# Patient Record
Sex: FEMALE | Race: BLACK OR AFRICAN AMERICAN | NOT HISPANIC OR LATINO | Employment: OTHER | URBAN - METROPOLITAN AREA
[De-identification: names, ages, dates, MRNs, and addresses within clinical notes are randomized per-mention and may not be internally consistent; named-entity substitution may affect disease eponyms.]

---

## 2017-07-14 ENCOUNTER — HOSPITAL ENCOUNTER (EMERGENCY)
Facility: HOSPITAL | Age: 77
Discharge: HOME | End: 2017-07-14
Attending: EMERGENCY MEDICINE
Payer: MEDICARE

## 2017-07-14 VITALS
RESPIRATION RATE: 18 BRPM | TEMPERATURE: 98 F | WEIGHT: 130 LBS | HEIGHT: 62 IN | OXYGEN SATURATION: 100 % | DIASTOLIC BLOOD PRESSURE: 69 MMHG | BODY MASS INDEX: 23.92 KG/M2 | SYSTOLIC BLOOD PRESSURE: 132 MMHG | HEART RATE: 56 BPM

## 2017-07-14 DIAGNOSIS — I10 UNCONTROLLED HYPERTENSION: ICD-10-CM

## 2017-07-14 DIAGNOSIS — N63.0 BREAST NODULE: Primary | ICD-10-CM

## 2017-07-14 DIAGNOSIS — R07.89 CHEST WALL PAIN: ICD-10-CM

## 2017-07-14 LAB
ALBUMIN SERPL BCP-MCNC: 3.9 G/DL
ALP SERPL-CCNC: 79 U/L
ALT SERPL W/O P-5'-P-CCNC: 15 U/L
ANION GAP SERPL CALC-SCNC: 8 MMOL/L
AST SERPL-CCNC: 18 U/L
BACTERIA #/AREA URNS HPF: NORMAL /HPF
BASOPHILS # BLD AUTO: 0.02 K/UL
BASOPHILS NFR BLD: 0.2 %
BILIRUB SERPL-MCNC: 0.3 MG/DL
BILIRUB UR QL STRIP: NEGATIVE
BNP SERPL-MCNC: 245 PG/ML
BUN SERPL-MCNC: 26 MG/DL
CALCIUM SERPL-MCNC: 10.1 MG/DL
CHLORIDE SERPL-SCNC: 105 MMOL/L
CLARITY UR: CLEAR
CO2 SERPL-SCNC: 26 MMOL/L
COLOR UR: ABNORMAL
CREAT SERPL-MCNC: 1.3 MG/DL
DIFFERENTIAL METHOD: ABNORMAL
EOSINOPHIL # BLD AUTO: 0.1 K/UL
EOSINOPHIL NFR BLD: 1.1 %
ERYTHROCYTE [DISTWIDTH] IN BLOOD BY AUTOMATED COUNT: 13.4 %
EST. GFR  (AFRICAN AMERICAN): 46 ML/MIN/1.73 M^2
EST. GFR  (NON AFRICAN AMERICAN): 40 ML/MIN/1.73 M^2
GLUCOSE SERPL-MCNC: 206 MG/DL
GLUCOSE UR QL STRIP: ABNORMAL
HCT VFR BLD AUTO: 36.3 %
HGB BLD-MCNC: 12.2 G/DL
HGB UR QL STRIP: NEGATIVE
HYALINE CASTS #/AREA URNS LPF: 1 /LPF
KETONES UR QL STRIP: NEGATIVE
LEUKOCYTE ESTERASE UR QL STRIP: NEGATIVE
LYMPHOCYTES # BLD AUTO: 3.3 K/UL
LYMPHOCYTES NFR BLD: 37.1 %
MCH RBC QN AUTO: 31.9 PG
MCHC RBC AUTO-ENTMCNC: 33.6 %
MCV RBC AUTO: 95 FL
MICROSCOPIC COMMENT: NORMAL
MONOCYTES # BLD AUTO: 0.7 K/UL
MONOCYTES NFR BLD: 7.9 %
NEUTROPHILS # BLD AUTO: 4.7 K/UL
NEUTROPHILS NFR BLD: 53.7 %
NITRITE UR QL STRIP: NEGATIVE
PH UR STRIP: 5 [PH] (ref 5–8)
PLATELET # BLD AUTO: 348 K/UL
PMV BLD AUTO: 9.1 FL
POTASSIUM SERPL-SCNC: 3.9 MMOL/L
PROT SERPL-MCNC: 8.2 G/DL
PROT UR QL STRIP: ABNORMAL
RBC # BLD AUTO: 3.83 M/UL
RBC #/AREA URNS HPF: 2 /HPF (ref 0–4)
SODIUM SERPL-SCNC: 139 MMOL/L
SP GR UR STRIP: 1.01 (ref 1–1.03)
SQUAMOUS #/AREA URNS HPF: 3 /HPF
TROPONIN I SERPL DL<=0.01 NG/ML-MCNC: 0.01 NG/ML
URN SPEC COLLECT METH UR: ABNORMAL
UROBILINOGEN UR STRIP-ACNC: NEGATIVE EU/DL
WBC # BLD AUTO: 8.77 K/UL
WBC #/AREA URNS HPF: 2 /HPF (ref 0–5)

## 2017-07-14 PROCEDURE — 99284 EMERGENCY DEPT VISIT MOD MDM: CPT | Mod: 25

## 2017-07-14 PROCEDURE — 83880 ASSAY OF NATRIURETIC PEPTIDE: CPT

## 2017-07-14 PROCEDURE — 80053 COMPREHEN METABOLIC PANEL: CPT

## 2017-07-14 PROCEDURE — 93005 ELECTROCARDIOGRAM TRACING: CPT

## 2017-07-14 PROCEDURE — 81000 URINALYSIS NONAUTO W/SCOPE: CPT

## 2017-07-14 PROCEDURE — 84484 ASSAY OF TROPONIN QUANT: CPT

## 2017-07-14 PROCEDURE — 96374 THER/PROPH/DIAG INJ IV PUSH: CPT

## 2017-07-14 PROCEDURE — 96361 HYDRATE IV INFUSION ADD-ON: CPT

## 2017-07-14 PROCEDURE — 63600175 PHARM REV CODE 636 W HCPCS: Performed by: EMERGENCY MEDICINE

## 2017-07-14 PROCEDURE — 85025 COMPLETE CBC W/AUTO DIFF WBC: CPT

## 2017-07-14 PROCEDURE — 25000003 PHARM REV CODE 250: Performed by: EMERGENCY MEDICINE

## 2017-07-14 RX ORDER — BENZONATATE 200 MG/1
200 CAPSULE ORAL 3 TIMES DAILY PRN
Qty: 20 CAPSULE | Refills: 0 | Status: SHIPPED | OUTPATIENT
Start: 2017-07-14 | End: 2017-07-24

## 2017-07-14 RX ORDER — ASPIRIN 325 MG
325 TABLET ORAL
Status: COMPLETED | OUTPATIENT
Start: 2017-07-14 | End: 2017-07-14

## 2017-07-14 RX ORDER — METOPROLOL TARTRATE 25 MG/1
25 TABLET, FILM COATED ORAL
Status: COMPLETED | OUTPATIENT
Start: 2017-07-14 | End: 2017-07-14

## 2017-07-14 RX ORDER — LOSARTAN POTASSIUM AND HYDROCHLOROTHIAZIDE 12.5; 5 MG/1; MG/1
2 TABLET ORAL
Status: COMPLETED | OUTPATIENT
Start: 2017-07-14 | End: 2017-07-14

## 2017-07-14 RX ORDER — PANTOPRAZOLE SODIUM 40 MG/1
40 TABLET, DELAYED RELEASE ORAL DAILY
COMMUNITY

## 2017-07-14 RX ORDER — DOXYCYCLINE 100 MG/1
100 CAPSULE ORAL 2 TIMES DAILY
Qty: 20 CAPSULE | Refills: 0 | Status: SHIPPED | OUTPATIENT
Start: 2017-07-14 | End: 2017-07-14

## 2017-07-14 RX ORDER — DOXYCYCLINE 100 MG/1
100 CAPSULE ORAL 2 TIMES DAILY
Qty: 20 CAPSULE | Refills: 0 | Status: SHIPPED | OUTPATIENT
Start: 2017-07-14 | End: 2017-07-24

## 2017-07-14 RX ORDER — CLONIDINE HYDROCHLORIDE 0.1 MG/1
0.2 TABLET ORAL
Status: COMPLETED | OUTPATIENT
Start: 2017-07-14 | End: 2017-07-14

## 2017-07-14 RX ORDER — TRAMADOL HYDROCHLORIDE 50 MG/1
50 TABLET ORAL EVERY 8 HOURS PRN
Qty: 15 TABLET | Refills: 0 | Status: SHIPPED | OUTPATIENT
Start: 2017-07-14 | End: 2017-07-17

## 2017-07-14 RX ORDER — RISEDRONATE SODIUM 35 MG/1
35 TABLET, FILM COATED ORAL
COMMUNITY

## 2017-07-14 RX ORDER — HYDRALAZINE HYDROCHLORIDE 20 MG/ML
10 INJECTION INTRAMUSCULAR; INTRAVENOUS
Status: COMPLETED | OUTPATIENT
Start: 2017-07-14 | End: 2017-07-14

## 2017-07-14 RX ORDER — TRAMADOL HYDROCHLORIDE 50 MG/1
50 TABLET ORAL EVERY 6 HOURS PRN
COMMUNITY
End: 2017-07-14 | Stop reason: SDUPTHER

## 2017-07-14 RX ADMIN — HYDRALAZINE HYDROCHLORIDE 10 MG: 20 INJECTION INTRAMUSCULAR; INTRAVENOUS at 04:07

## 2017-07-14 RX ADMIN — SODIUM CHLORIDE 500 ML: 0.9 INJECTION, SOLUTION INTRAVENOUS at 05:07

## 2017-07-14 RX ADMIN — CLONIDINE HYDROCHLORIDE 0.2 MG: 0.1 TABLET ORAL at 03:07

## 2017-07-14 RX ADMIN — LOSARTAN POTASSIUM AND HYDROCHLOROTHIAZIDE 2 TABLET: 12.5; 5 TABLET ORAL at 03:07

## 2017-07-14 RX ADMIN — ASPIRIN 325 MG ORAL TABLET 325 MG: 325 PILL ORAL at 03:07

## 2017-07-14 RX ADMIN — METOPROLOL TARTRATE 25 MG: 25 TABLET ORAL at 03:07

## 2017-07-14 NOTE — ED PROVIDER NOTES
"Encounter Date: 7/14/2017    SCRIBE #1 NOTE: I, Zelda Mcknight, am scribing for, and in the presence of,  Alcon Jacinto MD. I have scribed the following portions of the note - Other sections scribed: HPI and ROS.       History     Chief Complaint   Patient presents with    Breast Pain     " I had a left mastectomy in 86 but about one month ago I noticed a knot in my right breast. I am having alot of pain in my right breast and now some in the left breast as well."      CC: Breast Pain    HPI: This 77 y.o. Female with PMHx of HTN, DM, breast cancer, and arthritis present to the ED c/o 1 month hx of acute onset, constant bilateral breast pain. Pt states she noticed a knot in her right breast when the pain started, but then the pain began in her left breast as well. Pt describes right breast pain as severe and left breast pain as mild. Pt also c/o SOB. No attempted treatment. Pt notes she has chronic constant headaches, and she attributes them to HTN. Pt denies fever, abdominal pain, nausea, and vomiting.       The history is provided by the patient. No  was used.     Review of patient's allergies indicates:  No Known Allergies  Past Medical History:   Diagnosis Date    Arthritis     Cancer     breast    Diabetes mellitus     Glaucoma     Hypertension      Past Surgical History:   Procedure Laterality Date    HYSTERECTOMY      MASTECTOMY       History reviewed. No pertinent family history.  Social History   Substance Use Topics    Smoking status: Current Every Day Smoker    Smokeless tobacco: Current User    Alcohol use Yes      Comment: occasional     Review of Systems   Constitutional: Negative for chills, diaphoresis and fever.   HENT: Negative for ear pain and sore throat.    Eyes: Negative for pain.   Respiratory: Positive for shortness of breath. Negative for cough.    Cardiovascular: Negative for chest pain.   Gastrointestinal: Negative for abdominal pain, diarrhea, nausea and " vomiting.   Genitourinary: Negative for dysuria.        (+) Bilateral breast pain.    Musculoskeletal: Negative for back pain.   Skin: Negative for rash.   Neurological: Positive for headaches (Chronic, constant).   All other systems reviewed and are negative.      Physical Exam     Initial Vitals [07/14/17 1435]   BP Pulse Resp Temp SpO2   (!) 221/107 65 16 98.9 °F (37.2 °C) 99 %      MAP       145         Physical Exam  Nursing note and vitals reviewed.  Constitutional:  Nontoxic elderly female in no obvious distress  HENT:    Head: NC/AT    Eyes: Conjunctivae normal.   (-) scleral icterus.             Mouth/Throat: MMM.      Neck: Neck supple, normal rom.    Cardiovascular: RRR  Pulmonary/Chest: CTAB / Left-sided mastectomy.    3 x 2 cm palpable mass medial aspect right breast.  No clavicular or axillary lymphadenopathy.  Abdominal: Soft. ND/NT   (-) CVA tenderness.  Musculoskeletal: FROM of all major joints. No extremity edema or tenderness.  Neurological: A&Ox3, Normal speech.  No acute focal motor deficits.     Skin: Skin is warm and dry.   Psychiatric: normal mood and affect.      ED Course   Procedures  Labs Reviewed   CBC W/ AUTO DIFFERENTIAL - Abnormal; Notable for the following:        Result Value    RBC 3.83 (*)     Hematocrit 36.3 (*)     MCH 31.9 (*)     MPV 9.1 (*)     All other components within normal limits   COMPREHENSIVE METABOLIC PANEL - Abnormal; Notable for the following:     Glucose 206 (*)     BUN, Bld 26 (*)     eGFR if  46 (*)     eGFR if non  40 (*)     All other components within normal limits   B-TYPE NATRIURETIC PEPTIDE - Abnormal; Notable for the following:      (*)     All other components within normal limits   URINALYSIS - Abnormal; Notable for the following:     Protein, UA 1+ (*)     Glucose, UA 1+ (*)     All other components within normal limits   TROPONIN I   URINALYSIS MICROSCOPIC        X-Rays:   Independently Interpreted Readings:    Chest X-Ray: Normal heart size.  No acute abnormalities.  No infiltrates.     EKG Readings: (Independently Interpreted)   Initial Reading: No STEMI.   Sinus bradycardia, rate 59, + LVH with repolarization abnormality, T-wave inversion in leads 1, aVL, V5 and V6      Medical Decision Making:   History:   I obtained history from: someone other than patient.  Old Medical Records: I decided to obtain old medical records.  Old Records Summarized: records from clinic visits and other records.  Independently Interpreted Test(s):   I have ordered and independently interpreted X-rays - see prior notes.  I have ordered and independently interpreted EKG Reading(s) - see prior notes  Clinical Tests:   Lab Tests: Ordered and Reviewed  Radiological Study: Ordered and Reviewed  Medical Tests: Ordered and Reviewed    Differential diagnosis:   Initial differential diagnosis includes but is not limited to... Fibrocystic changes, breast cancer, ACS, PE, hypertensive emergency including aortic dissection, pericarditis.     Additional Medical Decision Makin-year-old female with a negative for hypertension, diabetes, arthritis and a remote history of breast cancer status post mastectomy to the emergency department complaining of a painful right breast nodule for the past 4-6 weeks - see history of present illness for further details.  On exam, she appears well and in no obvious distress.  She has a 3 cm x 2 cm palpable nodule to the medial aspect of the right breast.   No palpable axillary or clavicular lymphadenopathy.  Initial /107.  EKG demonstrates significant LVH with T-wave inversion in the lateral leads largely unchanged from previous EKG - troponin negative.  Basic labs unremarkable without evidence of end organ damage.  Home antihypertensives meds have been given along with IV hydralazine.  She has been strongly advised to follow-up with her primary care physician as soon as possible for further evaluation and  management including mammogram vs ultrasound/FNA.  Chest pain precautions as well as return instructions discussed prior to discharge.          Scribe Attestation:   Scribe #1: I performed the above scribed service and the documentation accurately describes the services I performed. I attest to the accuracy of the note.    Attending Attestation:           Physician Attestation for Scribe:  Physician Attestation Statement for Scribe #1: I, Alcon Jacinto MD, reviewed documentation, as scribed by Zelda Mcknight in my presence, and it is both accurate and complete.                 ED Course     Clinical Impression:   The primary encounter diagnosis was Right sided breast nodule. Diagnoses of Chest wall pain and Uncontrolled hypertension were also pertinent to this visit.                           Alcon Jacinto MD  07/14/17 2015

## 2017-07-14 NOTE — ED NOTES
MD made aware of decreased BP and pt complaints of dizziness. States will order bolus of fluids. Side rails up. Bed lowered. Call light in reach. Daughter at bedside. Will continue to monitor

## 2017-07-14 NOTE — ED TRIAGE NOTES
"Arrived via personal transportation. Pt complains of rt sided breast pain that started about 1 month ago. Pt states "I had a mastectomy in either 1985 or 1986. The pain is on my rt side but it's moving to my left side where I had the mastectomy". AAOx3.   "

## 2021-07-09 ENCOUNTER — HOSPITAL ENCOUNTER (EMERGENCY)
Facility: HOSPITAL | Age: 81
Discharge: HOME | End: 2021-07-09
Attending: EMERGENCY MEDICINE
Payer: MEDICARE

## 2021-07-09 VITALS
BODY MASS INDEX: 24.29 KG/M2 | SYSTOLIC BLOOD PRESSURE: 174 MMHG | RESPIRATION RATE: 18 BRPM | OXYGEN SATURATION: 100 % | DIASTOLIC BLOOD PRESSURE: 83 MMHG | WEIGHT: 132 LBS | HEIGHT: 62 IN | TEMPERATURE: 98 F | HEART RATE: 58 BPM

## 2021-07-09 DIAGNOSIS — S86.911A KNEE STRAIN, RIGHT, INITIAL ENCOUNTER: ICD-10-CM
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
## 2021-07-09 DIAGNOSIS — M79.652 LEFT THIGH PAIN: ICD-10-CM

## 2021-07-09 DIAGNOSIS — S70.12XA HEMATOMA OF LEFT THIGH, INITIAL ENCOUNTER: Primary | ICD-10-CM

## 2021-07-09 PROCEDURE — 99284 EMERGENCY DEPT VISIT MOD MDM: CPT

## 2021-12-29 ENCOUNTER — HOSPITAL ENCOUNTER (EMERGENCY)
Facility: HOSPITAL | Age: 81
Discharge: HOME | End: 2021-12-29
Attending: EMERGENCY MEDICINE
Payer: MEDICARE

## 2021-12-29 VITALS
WEIGHT: 134 LBS | HEART RATE: 62 BPM | TEMPERATURE: 102 F | RESPIRATION RATE: 18 BRPM | OXYGEN SATURATION: 98 % | DIASTOLIC BLOOD PRESSURE: 64 MMHG | SYSTOLIC BLOOD PRESSURE: 141 MMHG | BODY MASS INDEX: 24.66 KG/M2 | HEIGHT: 62 IN

## 2021-12-29 DIAGNOSIS — R51.9 ACUTE NONINTRACTABLE HEADACHE, UNSPECIFIED HEADACHE TYPE: ICD-10-CM

## 2021-12-29 DIAGNOSIS — R52 BODY ACHES: ICD-10-CM

## 2021-12-29 DIAGNOSIS — U07.1 COVID-19 VIRUS INFECTION: Primary | ICD-10-CM

## 2021-12-29 DIAGNOSIS — R05.9 COUGH: ICD-10-CM

## 2021-12-29 LAB
BACTERIA #/AREA URNS HPF: NORMAL /HPF
BILIRUB UR QL STRIP: NEGATIVE
CLARITY UR: CLEAR
COLOR UR: COLORLESS
CTP QC/QA: YES
CTP QC/QA: YES
GLUCOSE UR QL STRIP: NEGATIVE
HGB UR QL STRIP: NEGATIVE
HYALINE CASTS #/AREA URNS LPF: 0 /LPF
KETONES UR QL STRIP: NEGATIVE
LEUKOCYTE ESTERASE UR QL STRIP: NEGATIVE
MICROSCOPIC COMMENT: NORMAL
NITRITE UR QL STRIP: NEGATIVE
PH UR STRIP: 5 [PH] (ref 5–8)
POC MOLECULAR INFLUENZA A AGN: NEGATIVE
POC MOLECULAR INFLUENZA B AGN: NEGATIVE
PROT UR QL STRIP: ABNORMAL
RBC #/AREA URNS HPF: 1 /HPF (ref 0–4)
SARS-COV-2 RDRP RESP QL NAA+PROBE: POSITIVE
SP GR UR STRIP: 1.01 (ref 1–1.03)
URN SPEC COLLECT METH UR: ABNORMAL
UROBILINOGEN UR STRIP-ACNC: NEGATIVE EU/DL
WBC #/AREA URNS HPF: 0 /HPF (ref 0–5)
WBC CLUMPS URNS QL MICRO: NORMAL

## 2021-12-29 PROCEDURE — 25000242 PHARM REV CODE 250 ALT 637 W/ HCPCS: Performed by: EMERGENCY MEDICINE

## 2021-12-29 PROCEDURE — 81000 URINALYSIS NONAUTO W/SCOPE: CPT | Performed by: EMERGENCY MEDICINE

## 2021-12-29 PROCEDURE — 25000003 PHARM REV CODE 250: Performed by: EMERGENCY MEDICINE

## 2021-12-29 PROCEDURE — U0002 COVID-19 LAB TEST NON-CDC: HCPCS | Performed by: EMERGENCY MEDICINE

## 2021-12-29 PROCEDURE — 87502 INFLUENZA DNA AMP PROBE: CPT

## 2021-12-29 PROCEDURE — 94640 AIRWAY INHALATION TREATMENT: CPT

## 2021-12-29 PROCEDURE — 99284 EMERGENCY DEPT VISIT MOD MDM: CPT | Mod: 25

## 2021-12-29 RX ORDER — GUAIFENESIN/DEXTROMETHORPHAN 100-10MG/5
5 SYRUP ORAL 4 TIMES DAILY PRN
Qty: 120 ML | Refills: 0 | Status: SHIPPED | OUTPATIENT
Start: 2021-12-29 | End: 2022-01-08

## 2021-12-29 RX ORDER — ACETAMINOPHEN 500 MG
1000 TABLET ORAL
Status: COMPLETED | OUTPATIENT
Start: 2021-12-29 | End: 2021-12-29

## 2021-12-29 RX ORDER — IBUPROFEN 600 MG/1
600 TABLET ORAL
Status: COMPLETED | OUTPATIENT
Start: 2021-12-29 | End: 2021-12-29

## 2021-12-29 RX ORDER — BENZONATATE 200 MG/1
200 CAPSULE ORAL 3 TIMES DAILY PRN
Qty: 20 CAPSULE | Refills: 0 | Status: SHIPPED | OUTPATIENT
Start: 2021-12-29 | End: 2022-01-08

## 2021-12-29 RX ORDER — ALBUTEROL SULFATE 90 UG/1
4 AEROSOL, METERED RESPIRATORY (INHALATION)
Status: COMPLETED | OUTPATIENT
Start: 2021-12-29 | End: 2021-12-29

## 2021-12-29 RX ADMIN — ACETAMINOPHEN 1000 MG: 500 TABLET ORAL at 03:12

## 2021-12-29 RX ADMIN — IBUPROFEN 600 MG: 600 TABLET, FILM COATED ORAL at 02:12

## 2021-12-29 RX ADMIN — ALBUTEROL SULFATE 4 PUFF: 108 INHALANT RESPIRATORY (INHALATION) at 05:12

## 2021-12-29 NOTE — ED PROVIDER NOTES
Encounter Date: 12/29/2021       History     Chief Complaint   Patient presents with    Headache     Headache, bilateral eye pain, dizziness, weakness since last night.     Weakness     80 yo female with CKD3, DM2, aortic valve stenosis, CAD, presents via daughter with headache, generalized weakness, eye pain bilaterally, right-sided ear pain, body aches especially to legs, and cough, ongoing for a few days.  Patient denies shortness of breath or chest pain.    PMH: CKD3, DM2, aortic valve stenosis, atrial fibrillation, palpitations, diastolic dysfunction, MI, CAD, LVH, dyspnea on exertion, heart murmur, diabetic polyneuropathy, rheumatoid arthritis, HTN, DLD, breast cancer, osteoporosis, GERD, colon polyp, vitamin D deficiency, vertigo, thyroid nodules, bilateral glaucoma, bilateral macular degeneration, carpal tunnel    PSH: colonoscopy, R breast biopsy, hysterectomy, left mastectomy    PMD is Dr. Saravanan Ashley.  [facility]    02/09/21 Echocardiogram showed mild left ventricular hypertrophy, EF 45-50%, basal mid lateral hypokinesis, inferolateral wall is akinetic, mitral sclerosis, mitral annular calcification, trace mitral and aortic insufficiency.     02/09/21 Stress test: small size nonreversible decreased uptake in the apical septal, mid anteroseptal segments. Post-stress large size nonreversible decreased uptake in the inferoseptal and inferior segments during post stress images. Normal LV systolic function with an EF of 76%.     10/29/18 Holter monitor showed minimal heart rate 42, max heart rate 88, sinus rhythm, rare PVCs and PACs. One atrial couplet, 1 episode of SVT, 3 beats.         Review of patient's allergies indicates:  No Known Allergies  Past Medical History:   Diagnosis Date    Arthritis     Cancer     breast    Diabetes mellitus     Glaucoma     Hypertension      Past Surgical History:   Procedure Laterality Date    HYSTERECTOMY       MASTECTOMY       No family history on file.  Social History     Tobacco Use    Smoking status: Current Every Day Smoker    Smokeless tobacco: Current User   Substance Use Topics    Alcohol use: Yes     Comment: occasional    Drug use: No     Review of Systems   Constitutional: Positive for fatigue.   HENT: Positive for ear pain. Negative for sore throat.    Eyes: Negative for visual disturbance.   Respiratory: Positive for cough. Negative for shortness of breath.    Cardiovascular: Negative for chest pain and leg swelling.   Gastrointestinal: Negative for abdominal pain.   Genitourinary: Negative for dysuria.   Musculoskeletal: Positive for gait problem (limps due to arthritis).   Skin: Negative for rash.   Neurological: Positive for headaches. Negative for dizziness, syncope and light-headedness.       Physical Exam     Initial Vitals [12/29/21 0052]   BP Pulse Resp Temp SpO2   (!) 141/64 84 16 (!) 102.5 °F (39.2 °C) 99 %      MAP       --         Physical Exam    Nursing note and vitals reviewed.  Constitutional: She appears well-developed and well-nourished. She is not diaphoretic.   Awake, alert, nontoxic.   HENT:   Head: Normocephalic and atraumatic.   Mouth/Throat: Oropharynx is clear and moist.   Eyes: Conjunctivae and EOM are normal. Pupils are equal, round, and reactive to light.   Neck: Neck supple.   Normal range of motion.  Cardiovascular: Normal rate, regular rhythm and intact distal pulses.   Murmur heard.  Pulmonary/Chest: Breath sounds normal. No respiratory distress. She has no wheezes. She has no rhonchi. She has no rales.   S/p left mastectomy   Abdominal: Abdomen is soft. There is no abdominal tenderness.   Musculoskeletal:         General: No tenderness or edema. Normal range of motion.      Cervical back: Normal range of motion and neck supple.     Neurological: She is alert and oriented to person, place, and time. She has normal strength.   Moving all extremities.   Skin: Skin is warm and  dry. No erythema. No pallor.   Psychiatric: She has a normal mood and affect.         ED Course   Procedures  Labs Reviewed   URINALYSIS, REFLEX TO URINE CULTURE - Abnormal; Notable for the following components:       Result Value    Color, UA Colorless (*)     Protein, UA 1+ (*)     All other components within normal limits    Narrative:     Specimen Source->Urine   SARS-COV-2 RDRP GENE - Abnormal; Notable for the following components:    POC Rapid COVID Positive (*)     All other components within normal limits   POCT INFLUENZA A/B MOLECULAR - Normal   URINALYSIS MICROSCOPIC    Narrative:     Specimen Source->Urine          Imaging Results    None          Medications   ibuprofen tablet 600 mg (600 mg Oral Given 12/29/21 0200)   acetaminophen tablet 1,000 mg (1,000 mg Oral Given 12/29/21 0310)   albuterol inhaler 4 puff (4 puffs Inhalation Given 12/29/21 0530)     Medical Decision Making:   History:   Old Medical Records: I decided to obtain old medical records.  Old Records Summarized: records from another hospital.  Initial Assessment:   80 yo female with CKD3, DM2, aortic valve stenosis, CAD, presents with headache, generalized weakness, eye pain bilaterally, right-sided ear pain, body aches especially to legs, and cough, ongoing for a few days.   Differential Diagnosis:   Ddx includes COVID-19, influenza, other viral URI, other.  Clinical Tests:   Lab Tests: Reviewed and Ordered  ED Management:  COVID-19 positive.    Ambulatory pulse ox 94% with HR 87 in ED on room air.  Patient in no distress with ambulation.    Tx'ed with APAP and ibuprofen for fever and albuterol for cough.    D/c'ed with supportive care. Referral for antibody infusion placed due to COVID-19 risk score 4.                       Clinical Impression:   Final diagnoses:  [U07.1] COVID-19 virus infection (Primary)  [R05.9] Cough  [R52] Body aches  [R51.9] Acute nonintractable headache, unspecified headache type          ED Disposition Condition     Discharge Stable        ED Prescriptions     Medication Sig Dispense Start Date End Date Auth. Provider    benzonatate (TESSALON) 200 MG capsule Take 1 capsule (200 mg total) by mouth 3 (three) times daily as needed for Cough. Keep this medication away from children. 20 capsule 12/29/2021 1/8/2022 Tammy Langston MD    dextromethorphan-guaiFENesin  mg/5 ml (ROBITUSSIN-DM)  mg/5 mL liquid Take 5 mLs by mouth 4 (four) times daily as needed (cough). 120 mL 12/29/2021 1/8/2022 Tammy Langston MD        Follow-up Information     Follow up With Specialties Details Why Contact Info    Saravanan Ashley MD Internal Medicine  As needed [address]  [facility] [address]  [phone]             Tammy Langston MD  12/29/21 0884

## 2021-12-29 NOTE — ED NOTES
Bed: 16main  Expected date:   Expected time:   Means of arrival: Personal Transportation  Comments:

## 2021-12-29 NOTE — DISCHARGE INSTRUCTIONS
ACUTE COVID-19    COVID-19 is a disease with variable courses. Some people will feel better, while some people will feel worse despite initial improvement.     It is useful to have a PULSE OXIMETER, which is a device to measure the oxygen in your blood. Most pulse oximeters will also measure heart rate. Normal pulse ox is over 95%; normal heart rate is  beats per minute. With COVID-19, many people will have lower oxygen levels and higher heart rates. If your oxygen level drops and stays below 90% (check it at rest and with activity), please come back to the emergency department to be reevaluated.    If you have worsening shortness of breath (even with normal pulse oximeter numbers), trouble breathing, persistent high fevers, or other serious symptoms, please return to the ED for re-evaluation.    SYMPTOM CONTROL    You may be prescribed an albuterol inhaler (Ventolin or ProAir are brand names). Use your albuterol inhaler generously for cough, wheezing, or shortness of breath. You may use 6 puffs every 2 hours if needed. Be aware that this medication can make your heart race, but these palpitations will resolve after an hour or so.    Use acetaminophen and ibuprofen to control your symptoms such as body aches, headaches, or fever. Be careful, because many over the counter medications such as Nyquil or Theraflu contain acetaminophen, ibuprofen, or both.     Over the counter tylenol (acetaminophen) comes in tablets of 325mg and 500mg. You may take 3 of the regular strength (325mg) or 2 of the extra strength (500mg) every 6 hours. Do not exceed a total of 4000mg a day.    Over the counter motrin (ibuprofen) comes in tablets of 200mg. You may take 3 of these tablets (a total of 600mg) every 6 hours or 4 of these tablets (a total of 800mg) every 8 hours. Do not exceed a total of 2400mg a day.     Benadryl (diphenhydramine) is used to treat nasal congestion or allergies. The usual adult dose is 50 mg (usually 2  over-the-counter tablets) every 4-6 hours as needed.  Benadryl can cause sedation, so it is good to take before bed. Do not take this medication before driving.    Flonase is a nasal steroid that can also be used for congestion.    COVID-19 AFTER EFFECTS    Persistent symptoms of COVID-19 have emerged. The most commonly reported symptoms include fatigue, dyspnea, cough, arthralgia, and chest pain. Other reported symptoms include cognitive impairment, depression, myalgia, headache, fever, and palpitations.  More serious complications appear to be less common but have been reported. These complications include:    Cardiovascular: myocardial inflammation, ventricular dysfunction  Respiratory: pulmonary function abnormalities  Renal: acute kidney injury  Dermatologic: rash, alopecia  Neurological: olfactory and gustatory dysfunction, sleep dysregulation, altered cognition, memory impairment  Psychiatric: depression, anxiety, changes in mood     Most people who have COVID-19 do well, but it is important to follow up to your physician if you don't feel better.

## 2022-01-02 ENCOUNTER — INFUSION (OUTPATIENT)
Dept: INFECTIOUS DISEASES | Facility: HOSPITAL | Age: 82
End: 2022-01-02
Attending: EMERGENCY MEDICINE
Payer: MEDICARE

## 2022-01-02 DIAGNOSIS — U07.1 COVID-19 VIRUS INFECTION: Primary | ICD-10-CM

## 2022-01-02 PROCEDURE — M0243 CASIRIVI AND IMDEVI INFUSION: HCPCS | Performed by: INTERNAL MEDICINE

## 2022-01-02 PROCEDURE — 25000003 PHARM REV CODE 250: Performed by: INTERNAL MEDICINE

## 2022-01-02 PROCEDURE — 63600175 PHARM REV CODE 636 W HCPCS: Performed by: INTERNAL MEDICINE

## 2022-01-03 VITALS
BODY MASS INDEX: 24.29 KG/M2 | OXYGEN SATURATION: 100 % | DIASTOLIC BLOOD PRESSURE: 60 MMHG | SYSTOLIC BLOOD PRESSURE: 126 MMHG | HEIGHT: 62 IN | TEMPERATURE: 98 F | HEART RATE: 48 BPM | RESPIRATION RATE: 19 BRPM | WEIGHT: 132 LBS

## 2022-01-03 RX ORDER — ALBUTEROL SULFATE 90 UG/1
2 AEROSOL, METERED RESPIRATORY (INHALATION)
Status: ACTIVE | OUTPATIENT
Start: 2022-01-03

## 2022-01-03 RX ORDER — DIPHENHYDRAMINE HYDROCHLORIDE 50 MG/ML
25 INJECTION INTRAMUSCULAR; INTRAVENOUS ONCE AS NEEDED
Status: ACTIVE | OUTPATIENT
Start: 2022-01-03 | End: 2033-06-01

## 2022-01-03 RX ORDER — ACETAMINOPHEN 325 MG/1
650 TABLET ORAL ONCE AS NEEDED
Status: ACTIVE | OUTPATIENT
Start: 2022-01-03 | End: 2033-06-01

## 2022-01-03 RX ORDER — SODIUM CHLORIDE 0.9 % (FLUSH) 0.9 %
10 SYRINGE (ML) INJECTION
Status: ACTIVE | OUTPATIENT
Start: 2022-01-03

## 2022-01-03 RX ORDER — ONDANSETRON 4 MG/1
4 TABLET, ORALLY DISINTEGRATING ORAL ONCE AS NEEDED
Status: ACTIVE | OUTPATIENT
Start: 2022-01-03 | End: 2033-06-01

## 2022-01-03 RX ORDER — EPINEPHRINE 0.3 MG/.3ML
0.3 INJECTION SUBCUTANEOUS
Status: ACTIVE | OUTPATIENT
Start: 2022-01-03

## 2022-01-03 RX ADMIN — CASIRIVIMAB AND IMDEVIMAB 600 MG: 600; 600 INJECTION, SOLUTION, CONCENTRATE INTRAVENOUS at 03:01

## 2022-01-03 NOTE — PROGRESS NOTES
Patient arrives for casirivimab/ imdevimab infusion. Ambulatory. Pt AAox3. No distress noted. RR even and unlabored.     Symptoms: HA, dizziness, body aches, SOB    onset date:  12/29/21    Tested COVID + on 12/29/21

## 2022-01-03 NOTE — PROGRESS NOTES
Infusion complete. Pt tolerated infusion well. No S/S of infusion reaction noted at present time. One hour observation started.

## 2022-01-03 NOTE — PROGRESS NOTES
Patient remains with no signs of complications noted. Patient received casirivimab/ imdevimab infusion with filtered tubing according to FDA recommendations and [facility] SOP without complications noted and left with mask in place. Drug fact sheet provided. Pt discharged home. Ambulatory. Remains AAox3. No distress noted. RR even and unlabored.

## 2022-01-19 ENCOUNTER — PES CALL (OUTPATIENT)
Dept: ADMINISTRATIVE | Facility: CLINIC | Age: 82
End: 2022-01-19
Payer: MEDICARE

## 2023-04-10 ENCOUNTER — HOSPITAL ENCOUNTER (EMERGENCY)
Facility: HOSPITAL | Age: 83
Discharge: HOME | End: 2023-04-10
Attending: STUDENT IN AN ORGANIZED HEALTH CARE EDUCATION/TRAINING PROGRAM
Payer: MEDICARE

## 2023-04-10 VITALS
BODY MASS INDEX: 24.66 KG/M2 | WEIGHT: 134 LBS | HEIGHT: 62 IN | HEART RATE: 60 BPM | TEMPERATURE: 99 F | RESPIRATION RATE: 18 BRPM | DIASTOLIC BLOOD PRESSURE: 68 MMHG | OXYGEN SATURATION: 98 % | SYSTOLIC BLOOD PRESSURE: 152 MMHG

## 2023-04-10 DIAGNOSIS — N39.490 OVERFLOW INCONTINENCE: ICD-10-CM

## 2023-04-10 DIAGNOSIS — R33.9 URINARY RETENTION: Primary | ICD-10-CM

## 2023-04-10 DIAGNOSIS — R10.30 LOWER ABDOMINAL PAIN: ICD-10-CM

## 2023-04-10 LAB
ALBUMIN SERPL BCP-MCNC: 3.9 G/DL (ref 3.5–5.2)
ALP SERPL-CCNC: 64 U/L (ref 55–135)
ALT SERPL W/O P-5'-P-CCNC: 19 U/L (ref 10–44)
ANION GAP SERPL CALC-SCNC: 13 MMOL/L (ref 8–16)
AST SERPL-CCNC: 21 U/L (ref 10–40)
BASOPHILS # BLD AUTO: 0.03 K/UL (ref 0–0.2)
BASOPHILS NFR BLD: 0.4 % (ref 0–1.9)
BILIRUB SERPL-MCNC: 0.8 MG/DL (ref 0.1–1)
BILIRUB UR QL STRIP: NEGATIVE
BUN SERPL-MCNC: 29 MG/DL (ref 8–23)
CALCIUM SERPL-MCNC: 9.8 MG/DL (ref 8.7–10.5)
CHLORIDE SERPL-SCNC: 107 MMOL/L (ref 95–110)
CLARITY UR: CLEAR
CO2 SERPL-SCNC: 23 MMOL/L (ref 23–29)
COLOR UR: YELLOW
CREAT SERPL-MCNC: 1.5 MG/DL (ref 0.5–1.4)
DIFFERENTIAL METHOD: ABNORMAL
EOSINOPHIL # BLD AUTO: 0.1 K/UL (ref 0–0.5)
EOSINOPHIL NFR BLD: 1.6 % (ref 0–8)
ERYTHROCYTE [DISTWIDTH] IN BLOOD BY AUTOMATED COUNT: 14.1 % (ref 11.5–14.5)
EST. GFR  (NO RACE VARIABLE): 34 ML/MIN/1.73 M^2
GLUCOSE SERPL-MCNC: 218 MG/DL (ref 70–110)
GLUCOSE UR QL STRIP: NEGATIVE
HCT VFR BLD AUTO: 31.7 % (ref 37–48.5)
HGB BLD-MCNC: 10.7 G/DL (ref 12–16)
HGB UR QL STRIP: ABNORMAL
IMM GRANULOCYTES # BLD AUTO: 0.03 K/UL (ref 0–0.04)
IMM GRANULOCYTES NFR BLD AUTO: 0.4 % (ref 0–0.5)
KETONES UR QL STRIP: NEGATIVE
LEUKOCYTE ESTERASE UR QL STRIP: NEGATIVE
LYMPHOCYTES # BLD AUTO: 1.9 K/UL (ref 1–4.8)
LYMPHOCYTES NFR BLD: 25.9 % (ref 18–48)
MCH RBC QN AUTO: 31.8 PG (ref 27–31)
MCHC RBC AUTO-ENTMCNC: 33.8 G/DL (ref 32–36)
MCV RBC AUTO: 94 FL (ref 82–98)
MONOCYTES # BLD AUTO: 0.5 K/UL (ref 0.3–1)
MONOCYTES NFR BLD: 6.9 % (ref 4–15)
NEUTROPHILS # BLD AUTO: 4.9 K/UL (ref 1.8–7.7)
NEUTROPHILS NFR BLD: 64.8 % (ref 38–73)
NITRITE UR QL STRIP: NEGATIVE
NRBC BLD-RTO: 0 /100 WBC
PH UR STRIP: 5 [PH] (ref 5–8)
PLATELET # BLD AUTO: ABNORMAL K/UL (ref 150–450)
PLATELET BLD QL SMEAR: ABNORMAL
PMV BLD AUTO: ABNORMAL FL (ref 9.2–12.9)
POTASSIUM SERPL-SCNC: 3.7 MMOL/L (ref 3.5–5.1)
PROT SERPL-MCNC: 7.7 G/DL (ref 6–8.4)
PROT UR QL STRIP: ABNORMAL
RBC # BLD AUTO: 3.36 M/UL (ref 4–5.4)
SODIUM SERPL-SCNC: 143 MMOL/L (ref 136–145)
SP GR UR STRIP: 1.01 (ref 1–1.03)
URN SPEC COLLECT METH UR: ABNORMAL
UROBILINOGEN UR STRIP-ACNC: NEGATIVE EU/DL
WBC # BLD AUTO: 7.5 K/UL (ref 3.9–12.7)

## 2023-04-10 PROCEDURE — 80053 COMPREHEN METABOLIC PANEL: CPT | Performed by: STUDENT IN AN ORGANIZED HEALTH CARE EDUCATION/TRAINING PROGRAM

## 2023-04-10 PROCEDURE — P9612 CATHETERIZE FOR URINE SPEC: HCPCS

## 2023-04-10 PROCEDURE — 96360 HYDRATION IV INFUSION INIT: CPT

## 2023-04-10 PROCEDURE — 25000003 PHARM REV CODE 250: Performed by: STUDENT IN AN ORGANIZED HEALTH CARE EDUCATION/TRAINING PROGRAM

## 2023-04-10 PROCEDURE — 99284 EMERGENCY DEPT VISIT MOD MDM: CPT | Mod: 25

## 2023-04-10 PROCEDURE — 81003 URINALYSIS AUTO W/O SCOPE: CPT | Performed by: STUDENT IN AN ORGANIZED HEALTH CARE EDUCATION/TRAINING PROGRAM

## 2023-04-10 PROCEDURE — 51798 US URINE CAPACITY MEASURE: CPT

## 2023-04-10 PROCEDURE — 51701 INSERT BLADDER CATHETER: CPT

## 2023-04-10 PROCEDURE — 85025 COMPLETE CBC W/AUTO DIFF WBC: CPT | Performed by: STUDENT IN AN ORGANIZED HEALTH CARE EDUCATION/TRAINING PROGRAM

## 2023-04-10 RX ORDER — CEPHALEXIN 500 MG/1
500 CAPSULE ORAL
Status: COMPLETED | OUTPATIENT
Start: 2023-04-10 | End: 2023-04-10

## 2023-04-10 RX ORDER — CEPHALEXIN 500 MG/1
500 CAPSULE ORAL EVERY 8 HOURS
Qty: 12 CAPSULE | Refills: 0 | Status: SHIPPED | OUTPATIENT
Start: 2023-04-10 | End: 2023-04-14

## 2023-04-10 RX ADMIN — SODIUM CHLORIDE 500 ML: 9 INJECTION, SOLUTION INTRAVENOUS at 04:04

## 2023-04-10 RX ADMIN — CEPHALEXIN 500 MG: 500 CAPSULE ORAL at 09:04

## 2023-04-10 NOTE — ED PROVIDER NOTES
Encounter Date: 4/10/2023       History     Chief Complaint   Patient presents with    Dysuria     Pt reports bladder pain and dysuria since Saturday. Denies fever or back pain.     83 year old female presents for lower abdominal pain and fullness associated with decreased urine output for the past 9 hours. She's had decreased urine output, and symptoms of incontinence since Saturday. She denies other symptoms.     Review of patient's allergies indicates:  No Known Allergies  Past Medical History:   Diagnosis Date    Arthritis     Cancer     breast    Diabetes mellitus     Glaucoma     Hypertension      Past Surgical History:   Procedure Laterality Date    HYSTERECTOMY      MASTECTOMY       History reviewed. No pertinent family history.  Social History     Tobacco Use    Smoking status: Every Day    Smokeless tobacco: Current   Substance Use Topics    Alcohol use: Yes     Comment: occasional    Drug use: No     Review of Systems   Constitutional:  Negative for activity change, appetite change, chills, fever and unexpected weight change.   HENT:  Negative for dental problem and drooling.    Eyes:  Negative for discharge and itching.   Respiratory:  Negative for cough, chest tightness, shortness of breath, wheezing and stridor.    Cardiovascular:  Negative for chest pain, palpitations and leg swelling.   Gastrointestinal:  Negative for abdominal distention, abdominal pain, diarrhea and nausea.   Genitourinary:  Positive for decreased urine volume, dysuria and urgency. Negative for difficulty urinating and frequency.   Musculoskeletal:  Negative for back pain, gait problem and joint swelling.   Neurological:  Negative for dizziness, syncope, numbness and headaches.   Psychiatric/Behavioral:  Negative for agitation, behavioral problems and confusion.      Physical Exam     Initial Vitals [04/10/23 1419]   BP Pulse Resp Temp SpO2   (!) 161/76 70 17 98.7 °F (37.1 °C) 97 %      MAP       --         Physical Exam    Nursing  note and vitals reviewed.  Constitutional: She appears well-developed and well-nourished. She is not diaphoretic.   HENT:   Head: Normocephalic and atraumatic.   Mouth/Throat: Oropharynx is clear and moist.   Eyes: EOM are normal. Pupils are equal, round, and reactive to light. Right eye exhibits no discharge. Left eye exhibits no discharge.   Neck: No tracheal deviation present.   Normal range of motion.  Cardiovascular:  Normal rate, regular rhythm and intact distal pulses.           Pulmonary/Chest: No respiratory distress. She has no wheezes. She exhibits no tenderness.   Abdominal: Abdomen is soft. She exhibits distension. There is abdominal tenderness.   Lower abdominal tenderness and distension   Musculoskeletal:         General: No tenderness or edema. Normal range of motion.      Cervical back: Normal range of motion.     Neurological: She is alert and oriented to person, place, and time. She has normal strength. No cranial nerve deficit or sensory deficit. GCS eye subscore is 4. GCS verbal subscore is 5. GCS motor subscore is 6.   Skin: Skin is warm and dry. No rash noted.   Psychiatric: She has a normal mood and affect. Her behavior is normal. Thought content normal.       ED Course   Procedures  Labs Reviewed   CBC W/ AUTO DIFFERENTIAL - Abnormal; Notable for the following components:       Result Value    RBC 3.36 (*)     Hemoglobin 10.7 (*)     Hematocrit 31.7 (*)     MCH 31.8 (*)     Platelet Estimate Clumped (*)     All other components within normal limits   URINALYSIS, REFLEX TO URINE CULTURE - Abnormal; Notable for the following components:    Protein, UA Trace (*)     Occult Blood UA Trace (*)     All other components within normal limits    Narrative:     Specimen Source->Urine   COMPREHENSIVE METABOLIC PANEL - Abnormal; Notable for the following components:    Glucose 218 (*)     BUN 29 (*)     Creatinine 1.5 (*)     eGFR 34 (*)     All other components within normal limits    Narrative:      Lab collect please  Collection has been rescheduled by [facility] at 04/10/2023 19:47 Reason:   Patient unavailable. In restroom          Imaging Results    None          Medications   sodium chloride 0.9% bolus 500 mL 500 mL (0 mLs Intravenous Stopped 4/10/23 1743)   cephALEXin capsule 500 mg (500 mg Oral Given 4/10/23 2110)                            83 year old female presents for dysuria, lower abdominal pain and fullness ongoing since Saturday. Vitals within normal limits. Bladder scan shows 800 mL retained urine, pt unable to void. Cath provided relief with 800 ml urine output. UA w/ hematuria, but will treat for UTI with Keflex given symptoms of dysuria, culture pending. CMP notable for mild elevation in Cr, elevation in BUN, elevated glucose c/w dehdyration, treated with IVF. Patient unable to void again, repeat bladder scan w 750 mL. Summers placed, pt educated, patient stable for discharge with outpatient uro gynecoloy follow-up.    Clinical Impression:   Final diagnoses:  [R33.9] Urinary retention (Primary)  [R10.30] Lower abdominal pain  [N39.490] Overflow incontinence        ED Disposition Condition    Discharge Stable          ED Prescriptions       Medication Sig Dispense Start Date End Date Auth. Provider    cephALEXin (KEFLEX) 500 MG capsule Take 1 capsule (500 mg total) by mouth every 8 (eight) hours. for 4 days 12 capsule 4/10/2023 4/14/2023 Mitchell Smith MD          Follow-up Information       Follow up With Specialties Details Why Contact Info Additional Information    [facility] Song - [facility] Call in 1 day To set up a follow-up appointment, To recheck today's symptoms [address]  [facility] [address] [phone] [address] Building, [address]th Floor Please park in [facility] and take Clinic elevator             Mitchell Smith MD  04/10/23 7870

## 2023-04-10 NOTE — ED TRIAGE NOTES
Pt presents to the ER c/o difficulty urinating. Pt reports that her bladder feels full but she is unable to void. Pt denies any pain.

## 2023-04-11 ENCOUNTER — HOSPITAL ENCOUNTER (EMERGENCY)
Facility: HOSPITAL | Age: 83
Discharge: HOME | End: 2023-04-11
Attending: STUDENT IN AN ORGANIZED HEALTH CARE EDUCATION/TRAINING PROGRAM
Payer: MEDICARE

## 2023-04-11 ENCOUNTER — TELEPHONE (OUTPATIENT)
Dept: EMERGENCY MEDICINE | Facility: HOSPITAL | Age: 83
End: 2023-04-11
Payer: MEDICARE

## 2023-04-11 ENCOUNTER — TELEPHONE (OUTPATIENT)
Dept: UROGYNECOLOGY | Facility: CLINIC | Age: 83
End: 2023-04-11
Payer: MEDICARE

## 2023-04-11 VITALS
OXYGEN SATURATION: 98 % | DIASTOLIC BLOOD PRESSURE: 71 MMHG | TEMPERATURE: 98 F | HEART RATE: 61 BPM | RESPIRATION RATE: 18 BRPM | SYSTOLIC BLOOD PRESSURE: 181 MMHG | WEIGHT: 134 LBS | HEIGHT: 62 IN | BODY MASS INDEX: 24.66 KG/M2

## 2023-04-11 DIAGNOSIS — R31.9 HEMATURIA, UNSPECIFIED TYPE: Primary | ICD-10-CM

## 2023-04-11 DIAGNOSIS — Z97.8 FOLEY CATHETER IN PLACE: ICD-10-CM

## 2023-04-11 DIAGNOSIS — R33.9 URINARY RETENTION: ICD-10-CM

## 2023-04-11 LAB
ALBUMIN SERPL BCP-MCNC: 3.7 G/DL (ref 3.5–5.2)
ALP SERPL-CCNC: 75 U/L (ref 55–135)
ALT SERPL W/O P-5'-P-CCNC: 16 U/L (ref 10–44)
ANION GAP SERPL CALC-SCNC: 11 MMOL/L (ref 8–16)
ANION GAP SERPL CALC-SCNC: 13 MMOL/L (ref 8–16)
AST SERPL-CCNC: 18 U/L (ref 10–40)
BACTERIA #/AREA URNS HPF: ABNORMAL /HPF
BASOPHILS # BLD AUTO: 0.04 K/UL (ref 0–0.2)
BASOPHILS NFR BLD: 0.5 % (ref 0–1.9)
BILIRUB SERPL-MCNC: 0.4 MG/DL (ref 0.1–1)
BILIRUB UR QL STRIP: NEGATIVE
BUN SERPL-MCNC: 28 MG/DL (ref 8–23)
BUN SERPL-MCNC: 29 MG/DL (ref 6–30)
CALCIUM SERPL-MCNC: 9.7 MG/DL (ref 8.7–10.5)
CHLORIDE SERPL-SCNC: 103 MMOL/L (ref 95–110)
CHLORIDE SERPL-SCNC: 107 MMOL/L (ref 95–110)
CLARITY UR: CLEAR
CO2 SERPL-SCNC: 27 MMOL/L (ref 23–29)
COLOR UR: YELLOW
CREAT SERPL-MCNC: 1.7 MG/DL (ref 0.5–1.4)
CREAT SERPL-MCNC: 1.7 MG/DL (ref 0.5–1.4)
DIFFERENTIAL METHOD: ABNORMAL
EOSINOPHIL # BLD AUTO: 0.2 K/UL (ref 0–0.5)
EOSINOPHIL NFR BLD: 2.4 % (ref 0–8)
ERYTHROCYTE [DISTWIDTH] IN BLOOD BY AUTOMATED COUNT: 13.8 % (ref 11.5–14.5)
EST. GFR  (NO RACE VARIABLE): 30 ML/MIN/1.73 M^2
GLUCOSE SERPL-MCNC: 143 MG/DL (ref 70–110)
GLUCOSE SERPL-MCNC: 146 MG/DL (ref 70–110)
GLUCOSE UR QL STRIP: NEGATIVE
HCT VFR BLD AUTO: 31.1 % (ref 37–48.5)
HCT VFR BLD CALC: 31 %PCV (ref 36–54)
HGB BLD-MCNC: 10.1 G/DL (ref 12–16)
HGB UR QL STRIP: ABNORMAL
HYALINE CASTS #/AREA URNS LPF: 0 /LPF
IMM GRANULOCYTES # BLD AUTO: 0.03 K/UL (ref 0–0.04)
IMM GRANULOCYTES NFR BLD AUTO: 0.4 % (ref 0–0.5)
KETONES UR QL STRIP: NEGATIVE
LEUKOCYTE ESTERASE UR QL STRIP: NEGATIVE
LYMPHOCYTES # BLD AUTO: 2.3 K/UL (ref 1–4.8)
LYMPHOCYTES NFR BLD: 27.8 % (ref 18–48)
MCH RBC QN AUTO: 30.9 PG (ref 27–31)
MCHC RBC AUTO-ENTMCNC: 32.5 G/DL (ref 32–36)
MCV RBC AUTO: 95 FL (ref 82–98)
MICROSCOPIC COMMENT: ABNORMAL
MONOCYTES # BLD AUTO: 0.7 K/UL (ref 0.3–1)
MONOCYTES NFR BLD: 8.6 % (ref 4–15)
NEUTROPHILS # BLD AUTO: 5 K/UL (ref 1.8–7.7)
NEUTROPHILS NFR BLD: 60.3 % (ref 38–73)
NITRITE UR QL STRIP: NEGATIVE
NRBC BLD-RTO: 0 /100 WBC
PH UR STRIP: 6 [PH] (ref 5–8)
PLATELET # BLD AUTO: 261 K/UL (ref 150–450)
PMV BLD AUTO: 8.9 FL (ref 9.2–12.9)
POC IONIZED CALCIUM: 1.33 MMOL/L (ref 1.06–1.42)
POC TCO2 (MEASURED): 31 MMOL/L (ref 23–29)
POTASSIUM BLD-SCNC: 3.2 MMOL/L (ref 3.5–5.1)
POTASSIUM SERPL-SCNC: 3.3 MMOL/L (ref 3.5–5.1)
PROT SERPL-MCNC: 7.3 G/DL (ref 6–8.4)
PROT UR QL STRIP: ABNORMAL
RBC # BLD AUTO: 3.27 M/UL (ref 4–5.4)
RBC #/AREA URNS HPF: >100 /HPF (ref 0–4)
SAMPLE: ABNORMAL
SODIUM BLD-SCNC: 143 MMOL/L (ref 136–145)
SODIUM SERPL-SCNC: 145 MMOL/L (ref 136–145)
SP GR UR STRIP: 1.02 (ref 1–1.03)
URN SPEC COLLECT METH UR: ABNORMAL
UROBILINOGEN UR STRIP-ACNC: NEGATIVE EU/DL
WBC # BLD AUTO: 8.28 K/UL (ref 3.9–12.7)
WBC #/AREA URNS HPF: 2 /HPF (ref 0–5)

## 2023-04-11 PROCEDURE — 85025 COMPLETE CBC W/AUTO DIFF WBC: CPT | Performed by: STUDENT IN AN ORGANIZED HEALTH CARE EDUCATION/TRAINING PROGRAM

## 2023-04-11 PROCEDURE — 84295 ASSAY OF SERUM SODIUM: CPT | Mod: 91

## 2023-04-11 PROCEDURE — 25000003 PHARM REV CODE 250: Performed by: STUDENT IN AN ORGANIZED HEALTH CARE EDUCATION/TRAINING PROGRAM

## 2023-04-11 PROCEDURE — 99900035 HC TECH TIME PER 15 MIN (STAT)

## 2023-04-11 PROCEDURE — 84132 ASSAY OF SERUM POTASSIUM: CPT

## 2023-04-11 PROCEDURE — 96360 HYDRATION IV INFUSION INIT: CPT

## 2023-04-11 PROCEDURE — 85014 HEMATOCRIT: CPT | Mod: 91

## 2023-04-11 PROCEDURE — 81000 URINALYSIS NONAUTO W/SCOPE: CPT | Performed by: STUDENT IN AN ORGANIZED HEALTH CARE EDUCATION/TRAINING PROGRAM

## 2023-04-11 PROCEDURE — 82330 ASSAY OF CALCIUM: CPT

## 2023-04-11 PROCEDURE — 82565 ASSAY OF CREATININE: CPT

## 2023-04-11 PROCEDURE — 80053 COMPREHEN METABOLIC PANEL: CPT | Performed by: STUDENT IN AN ORGANIZED HEALTH CARE EDUCATION/TRAINING PROGRAM

## 2023-04-11 PROCEDURE — 99284 EMERGENCY DEPT VISIT MOD MDM: CPT | Mod: 25

## 2023-04-11 RX ORDER — CEPHALEXIN 250 MG/1
500 CAPSULE ORAL
Status: COMPLETED | OUTPATIENT
Start: 2023-04-11 | End: 2023-04-11

## 2023-04-11 RX ORDER — CEPHALEXIN 500 MG/1
500 CAPSULE ORAL 4 TIMES DAILY
Qty: 20 CAPSULE | Refills: 0 | Status: SHIPPED | OUTPATIENT
Start: 2023-04-11 | End: 2023-04-16

## 2023-04-11 RX ADMIN — SODIUM CHLORIDE 500 ML: 9 INJECTION, SOLUTION INTRAVENOUS at 09:04

## 2023-04-11 RX ADMIN — CEPHALEXIN 500 MG: 250 CAPSULE ORAL at 10:04

## 2023-04-11 NOTE — TELEPHONE ENCOUNTER
----- Message from Jazmyn Rutherford sent at 4/11/2023 11:18 AM CDT -----  Type:  Appointment Request    Name of Caller:AMENA AGUILAR [[id number]]  When is the first available appointment?No access  Symptoms:referral  Would the patient rather a call back or a response via [facility]? Call back   Best Call Back Number:[phone]  Additional Information: Patient indicates she has a referral to the department form the ED. Patient indicates she would like to schedule an appointment with any provider for the soonest appointment available. Patient indicates she would like to be seen regarding her active referral. Patient indicates she has Urinary retention & Overflow incontinence. Patient indicates she has a catheter. Patient indicates she would like a call back with further assistance and more information if possible.

## 2023-04-11 NOTE — DISCHARGE INSTRUCTIONS

## 2023-04-11 NOTE — TELEPHONE ENCOUNTER
----- Message from Jazmyn Rutherford sent at 4/11/2023 11:18 AM CDT -----  Type:  Appointment Request    Name of Caller:AMENA AGUILAR [[id number]]  When is the first available appointment?No access  Symptoms:referral  Would the patient rather a call back or a response via [facility]? Call back   Best Call Back Number:[phone]  Additional Information: Patient indicates she has a referral to the department form the ED. Patient indicates she would like to schedule an appointment with any provider for the soonest appointment available. Patient indicates she would like to be seen regarding her active referral. Patient indicates she has Urinary retention & Overflow incontinence. Patient indicates she has a catheter. Patient indicates she would like a call back with further assistance and more information if possible.

## 2023-04-12 ENCOUNTER — TELEPHONE (OUTPATIENT)
Dept: UROLOGY | Facility: CLINIC | Age: 83
End: 2023-04-12
Payer: MEDICARE

## 2023-04-12 NOTE — TELEPHONE ENCOUNTER
Called pt to let her know we were able to schedule her an appt on 4/14/23 at 1:20 pm with Dr Franklin.

## 2023-04-12 NOTE — ED PROVIDER NOTES
Encounter Date: 4/11/2023       History     Chief Complaint   Patient presents with    Hematuria     Seen last night for bladder obstruction and sent home with urinary catheter.  Report blood in urine today with increased pain to lower abdomen.  + Blood thinners     83-year-old female whom I saw yesterday for acute urinary retention, was discharged with a Summers catheter and reactions to have outpatient follow-up with Urology.  Today she returns for hematuria and ongoing lower abdominal pain.  She reports she was unable to make an appointment because Urogynecology did not call her back.  She attempted to  her antibiotics but the [facility] stated they never received the electronic prescription I sent yesterday.  She denies nausea or vomiting.  She denies fevers or chills.  She reports normal bowel movements.  She reported that she takes blood thinners to the triage nurse but her medication list does not have any blood thinners on them.    Review of patient's allergies indicates:  No Known Allergies  Past Medical History:   Diagnosis Date    Arthritis     Cancer     breast    Diabetes mellitus     Glaucoma     Hypertension      Past Surgical History:   Procedure Laterality Date    HYSTERECTOMY      MASTECTOMY       No family history on file.  Social History     Tobacco Use    Smoking status: Every Day    Smokeless tobacco: Current   Substance Use Topics    Alcohol use: Yes     Comment: occasional    Drug use: No     Review of Systems   Constitutional:  Negative for activity change, appetite change, chills, fever and unexpected weight change.   HENT:  Negative for dental problem and drooling.    Eyes:  Negative for discharge and itching.   Respiratory:  Negative for cough, chest tightness, shortness of breath, wheezing and stridor.    Cardiovascular:  Negative for chest pain, palpitations and leg swelling.   Gastrointestinal:  Negative for abdominal distention, abdominal pain, diarrhea and nausea.    Genitourinary:  Positive for hematuria and urgency. Negative for difficulty urinating, dysuria and frequency.   Musculoskeletal:  Negative for back pain, gait problem and joint swelling.   Neurological:  Negative for dizziness, syncope, numbness and headaches.   Psychiatric/Behavioral:  Negative for agitation, behavioral problems and confusion.      Physical Exam     Initial Vitals [04/11/23 1934]   BP Pulse Resp Temp SpO2   (!) 162/75 71 18 98.2 °F (36.8 °C) 98 %      MAP       --         Physical Exam    Nursing note and vitals reviewed.  Constitutional: She appears well-developed and well-nourished. She is not diaphoretic.   HENT:   Head: Normocephalic and atraumatic.   Mouth/Throat: Oropharynx is clear and moist.   Eyes: EOM are normal. Pupils are equal, round, and reactive to light. Right eye exhibits no discharge. Left eye exhibits no discharge.   Neck: No tracheal deviation present.   Normal range of motion.  Cardiovascular:  Normal rate, regular rhythm and intact distal pulses.           Pulmonary/Chest: No respiratory distress. She has no wheezes. She exhibits no tenderness.   Abdominal: Abdomen is soft. She exhibits no distension. There is abdominal tenderness.   Mild lower abdominal tenderness to palpation   Musculoskeletal:         General: No tenderness or edema. Normal range of motion.      Cervical back: Normal range of motion.     Neurological: She is alert and oriented to person, place, and time. She has normal strength. No cranial nerve deficit or sensory deficit. GCS eye subscore is 4. GCS verbal subscore is 5. GCS motor subscore is 6.   Skin: Skin is warm and dry. No rash noted.   Psychiatric: She has a normal mood and affect. Her behavior is normal. Thought content normal.       ED Course   Procedures  Labs Reviewed   CBC W/ AUTO DIFFERENTIAL - Abnormal; Notable for the following components:       Result Value    RBC 3.27 (*)     Hemoglobin 10.1 (*)     Hematocrit 31.1 (*)     MPV 8.9 (*)      All other components within normal limits   COMPREHENSIVE METABOLIC PANEL - Abnormal; Notable for the following components:    Potassium 3.3 (*)     Glucose 146 (*)     BUN 28 (*)     Creatinine 1.7 (*)     eGFR 30 (*)     All other components within normal limits   URINALYSIS, REFLEX TO URINE CULTURE - Abnormal; Notable for the following components:    Protein, UA 2+ (*)     Occult Blood UA 3+ (*)     All other components within normal limits    Narrative:     Specimen Source->Urine   URINALYSIS MICROSCOPIC - Abnormal; Notable for the following components:    RBC, UA >100 (*)     All other components within normal limits    Narrative:     Specimen Source->Urine   ISTAT PROCEDURE - Abnormal; Notable for the following components:    POC Glucose 143 (*)     POC Creatinine 1.7 (*)     POC Potassium 3.2 (*)     POC TCO2 (MEASURED) 31 (*)     POC Hematocrit 31 (*)     All other components within normal limits   ISTAT CHEM8          Imaging Results              CT Abdomen Pelvis  Without Contrast (Final result)  Result time 04/11/23 22:06:50      Final result by Sarahi Nixon MD (04/11/23 22:06:50)                   Impression:      No acute findings on CT abdomen and pelvis without contrast.    Thickening of the distal most stomach.  As above described.  Question epigastric or upper abdominal pain.    Colonic diverticulosis.      Electronically signed by: Sarahi Nixon  Date:    04/11/2023  Time:    22:06               Narrative:    EXAMINATION:  CT ABDOMEN PELVIS WITHOUT    CLINICAL HISTORY:  Hematuria.  Lower abdominal pain.    TECHNIQUE:  5 mm unenhanced axial images from the lung bases through the greater trochanters were performed.  Coronal and sagittal reformatted images were provided.    COMPARISON:  Lower abdominal pain.    FINDINGS:  Within the limits of a noncontrast examination, the liver, spleen, pancreas, kidneys, and adrenal glands are unremarkable.  The gallbladder contains no calcified  gallstones..  In the intrathoracic obstructive uropathy.    There is thickening of the distal most stomach which may be due to peristalsis rather than a discrete lesion.    There is no ascites or gross abdominal adenopathy is seen moderate vascular calcifications are present.    A Summers catheter seen within the urinary bladder.  There is colonic diverticulosis without definite evidence of acute diverticulitis.  There are no pelvic masses or adenopathy.  There is no free pelvic fluid.  The uterus is surgically absent.    At the lung bases, there is mild right basilar atelectatic change.                                       Medications   sodium chloride 0.9% bolus 500 mL 500 mL (0 mLs Intravenous Stopped 4/11/23 2210)   cephALEXin capsule 500 mg (500 mg Oral Given 4/11/23 2235)                 ED Course as of 04/11/23 2300   Tue Apr 11, 2023 2234 CT Abdomen Pelvis  Without Contrast [BS]   2234 CBC auto differential(!) [BS]   2234 Comprehensive metabolic panel(!) [BS]   2234 ISTAT PROCEDURE(!) [BS]   2234 Urinalysis Microscopic(!) [BS]   2234 Urinalysis, Reflex to Urine Culture Urine, Clean Catch(!) [BS]      ED Course User Index  [BS] Mitchell Smith MD               Patient well-appearing and comfortable.  Abdomen is soft and nontender.  Vitals are normal.  Summers in place with hematuria.  UA consistent with hematuria.  CMP with mildly elevated creatinine to 1.7 up from patient's baseline of 1.3.  Patient given 500 mL IV fluids.  CBC without evidence of leukocytosis.  Doubt sepsis given patient well-appearing, normal vitals, no leukocytosis. No flank pain to suggest pyelonephritis.  CT without contrast notable for no acute abnormality.  Patient had been unable to make Urogynecology appointment.  Will message the Urogynecology team and also the patient to urology referral.  Stable for discharge outpatient follow-up.        Clinical Impression:   Final diagnoses:  [R31.9] Hematuria, unspecified type  (Primary)  [Z97.8] Summers catheter in place  [R33.9] Urinary retention        ED Disposition Condition    Discharge Stable          ED Prescriptions    None       Follow-up Information       Follow up With Specialties Details Why Contact Info Additional Information    Osmin Butler MD Urology Call in 1 day To discuss recent ED visit, To set up a follow-up appointment [address]  [address]  [facility] [address]  [phone]       [facility] Urogynecology Call in 1 day To set up a follow-up appointment, To recheck today's symptoms [address] [facility] [address] [phone] Main Building, 5th Floor Please park in [facility] and take Clinic elevator             Mitchell Smith MD  04/11/23 9733

## 2023-04-12 NOTE — ED NOTES
Pt to room 22 for eval of hematuria to catheter and lower abd pain. Pt was seen here last night for urinary retention and went home with feliciano catheter- pt reports urine was originally clear but then had blood with clots in it. Urine dark and unique color at present. Pt denies and n/v.

## 2023-04-12 NOTE — DISCHARGE INSTRUCTIONS

## 2023-04-13 ENCOUNTER — TELEPHONE (OUTPATIENT)
Dept: UROLOGY | Facility: CLINIC | Age: 83
End: 2023-04-13
Payer: MEDICARE

## 2023-04-14 ENCOUNTER — OFFICE VISIT (OUTPATIENT)
Dept: UROLOGY | Facility: CLINIC | Age: 83
End: 2023-04-14
Payer: MEDICARE

## 2023-04-14 VITALS
HEART RATE: 61 BPM | SYSTOLIC BLOOD PRESSURE: 162 MMHG | WEIGHT: 128.31 LBS | BODY MASS INDEX: 23.61 KG/M2 | DIASTOLIC BLOOD PRESSURE: 72 MMHG | HEIGHT: 62 IN

## 2023-04-14 DIAGNOSIS — R33.9 URINARY RETENTION: Primary | ICD-10-CM

## 2023-04-14 PROCEDURE — 3077F PR MOST RECENT SYSTOLIC BLOOD PRESSURE >= 140 MM HG: ICD-10-PCS | Mod: CPTII,S$GLB,, | Performed by: UROLOGY

## 2023-04-14 PROCEDURE — 1125F AMNT PAIN NOTED PAIN PRSNT: CPT | Mod: CPTII,S$GLB,, | Performed by: UROLOGY

## 2023-04-14 PROCEDURE — 1125F PR PAIN SEVERITY QUANTIFIED, PAIN PRESENT: ICD-10-PCS | Mod: CPTII,S$GLB,, | Performed by: UROLOGY

## 2023-04-14 PROCEDURE — 99999 PR PBB SHADOW E&M-EST. PATIENT-LVL III: CPT | Mod: PBBFAC,,, | Performed by: UROLOGY

## 2023-04-14 PROCEDURE — 99999 PR PBB SHADOW E&M-EST. PATIENT-LVL III: ICD-10-PCS | Mod: PBBFAC,,, | Performed by: UROLOGY

## 2023-04-14 PROCEDURE — 99204 PR OFFICE/OUTPT VISIT, NEW, LEVL IV, 45-59 MIN: ICD-10-PCS | Mod: S$GLB,,, | Performed by: UROLOGY

## 2023-04-14 PROCEDURE — 1101F PT FALLS ASSESS-DOCD LE1/YR: CPT | Mod: CPTII,S$GLB,, | Performed by: UROLOGY

## 2023-04-14 PROCEDURE — 1159F PR MEDICATION LIST DOCUMENTED IN MEDICAL RECORD: ICD-10-PCS | Mod: CPTII,S$GLB,, | Performed by: UROLOGY

## 2023-04-14 PROCEDURE — 3078F DIAST BP <80 MM HG: CPT | Mod: CPTII,S$GLB,, | Performed by: UROLOGY

## 2023-04-14 PROCEDURE — 3077F SYST BP >= 140 MM HG: CPT | Mod: CPTII,S$GLB,, | Performed by: UROLOGY

## 2023-04-14 PROCEDURE — 3288F FALL RISK ASSESSMENT DOCD: CPT | Mod: CPTII,S$GLB,, | Performed by: UROLOGY

## 2023-04-14 PROCEDURE — 3288F PR FALLS RISK ASSESSMENT DOCUMENTED: ICD-10-PCS | Mod: CPTII,S$GLB,, | Performed by: UROLOGY

## 2023-04-14 PROCEDURE — 1159F MED LIST DOCD IN RCRD: CPT | Mod: CPTII,S$GLB,, | Performed by: UROLOGY

## 2023-04-14 PROCEDURE — 1101F PR PT FALLS ASSESS DOC 0-1 FALLS W/OUT INJ PAST YR: ICD-10-PCS | Mod: CPTII,S$GLB,, | Performed by: UROLOGY

## 2023-04-14 PROCEDURE — 3078F PR MOST RECENT DIASTOLIC BLOOD PRESSURE < 80 MM HG: ICD-10-PCS | Mod: CPTII,S$GLB,, | Performed by: UROLOGY

## 2023-04-14 PROCEDURE — 99204 OFFICE O/P NEW MOD 45 MIN: CPT | Mod: S$GLB,,, | Performed by: UROLOGY

## 2023-04-14 NOTE — PROGRESS NOTES
[facility]      New Urinary Retention Note    4/14/2023    Referred by:  No ref. provider found    HPI: Seema Kitchen is a very pleasant 83 y.o. female referred for evaluation of acute urinary retention of 1-2 weeks duration. She currently has a chronic indwelling urethral catheter for 2 weeks. She presented to ED with urinary retention. Prior to this she denies any voiding difficulty, frequency, urgency or other issues. No previous urethral or bladder surgery. She is an insulin-dependent diabetic. She has a history of breast cancer. Urine at time showed TNTC RBC with few whites.     A voiding trial was performed today. She leaked out most of the instilled fluid prior to reaching the bathroom. PVR was 0 mL.     Relevant Medications:  No current medications that would be anticipated to impair voiding    Prior Therapies:  None    She does not report prior urodynamic evaluation. She does not reports prior cystoscopic evaluation. CT without contrast demonstrated no  abnormalities.     A review of 10+ systems was conducted with pertinent positive and negative findings documented in HPI with all other systems reviewed and negative.    Past medical, family, surgical and social history reviewed as documented in chart with pertinent positive medical, family, surgical and social history detailed in HPI.    Exam Findings:    Const: no acute distress, conversant and alert  Eyes: anicteric, extraocular muscles intact  ENMT: normocephalic, Nl oral membranes  Cardio: no cyanosis, nl cap refill  Pulm: no tachypnea; no resp distress  Musc: no laceration, no tenderness  Neuro: alert; oriented x 3  Skin: warm, dry; no petichiae  Psych: no anxiety; normal speech          Assessment/Plan:    Chronic Urinary Retention (new, addt'l workup): It is unclear what may have precipitated her urinary retention. We left the catheter out, though we discussed the risk that she may not be able to void. She would not be  able to perform self-catheterization. We will have her return on Tuesday to recheck a PVR.

## 2023-04-17 ENCOUNTER — TELEPHONE (OUTPATIENT)
Dept: UROLOGY | Facility: CLINIC | Age: 83
End: 2023-04-17
Payer: MEDICARE

## 2023-04-18 ENCOUNTER — OFFICE VISIT (OUTPATIENT)
Dept: UROLOGY | Facility: CLINIC | Age: 83
End: 2023-04-18
Payer: MEDICARE

## 2023-04-18 DIAGNOSIS — R33.9 URINARY RETENTION: Primary | ICD-10-CM

## 2023-04-18 PROCEDURE — 99214 PR OFFICE/OUTPT VISIT, EST, LEVL IV, 30-39 MIN: ICD-10-PCS | Mod: S$GLB,,, | Performed by: UROLOGY

## 2023-04-18 PROCEDURE — 99214 OFFICE O/P EST MOD 30 MIN: CPT | Mod: S$GLB,,, | Performed by: UROLOGY

## 2023-04-18 NOTE — PROGRESS NOTES
[facility]        Urinary Retention Return Note     4/18/2023     Referred by:  No ref. provider found     HPI: Seema Kitchen is a very pleasant 83 y.o. female referred for evaluation of acute urinary retention of 1-2 weeks duration. She presented with an indwelling urethral catheter for 2 weeks. She presented to ED with urinary retention. Prior to this she denies any voiding difficulty, frequency, urgency or other issues. No previous urethral or bladder surgery. She is an insulin-dependent diabetic. She has a history of breast cancer. Urine at time showed TNTC RBC with few whites.      A voiding trial was performed last visit. She leaked out most of the instilled fluid prior to reaching the bathroom. PVR was 0 mL.     She returns today reporting 4 mL     Relevant Medications:  No current medications that would be anticipated to impair voiding     Prior Therapies:  None     She does not report prior urodynamic evaluation. She does not reports prior cystoscopic evaluation. CT without contrast demonstrated no  abnormalities.      A review of 10+ systems was conducted with pertinent positive and negative findings documented in HPI with all other systems reviewed and negative.     Past medical, family, surgical and social history reviewed as documented in chart with pertinent positive medical, family, surgical and social history detailed in HPI.     Exam Findings:     Const: no acute distress, conversant and alert  Eyes: anicteric, extraocular muscles intact  ENMT: normocephalic, Nl oral membranes  Cardio: no cyanosis, nl cap refill  Pulm: no tachypnea; no resp distress  Musc: no laceration, no tenderness  Neuro: alert; oriented x 3  Skin: warm, dry; no petichiae  Psych: no anxiety; normal speech           Assessment/Plan:     Chronic Urinary Retention (established,improved): It is unclear what may have precipitated her urinary retention. We left the catheter out, and she has voided well  since last Friday. Will recheck a PVR in 3-4 weeks.

## 2023-05-16 ENCOUNTER — OFFICE VISIT (OUTPATIENT)
Dept: UROLOGY | Facility: CLINIC | Age: 83
End: 2023-05-16
Payer: MEDICARE

## 2023-05-16 VITALS
DIASTOLIC BLOOD PRESSURE: 73 MMHG | SYSTOLIC BLOOD PRESSURE: 157 MMHG | HEIGHT: 62 IN | WEIGHT: 130.06 LBS | HEART RATE: 52 BPM | BODY MASS INDEX: 23.93 KG/M2

## 2023-05-16 DIAGNOSIS — R33.9 URINARY RETENTION: Primary | ICD-10-CM

## 2023-05-16 PROCEDURE — 1126F AMNT PAIN NOTED NONE PRSNT: CPT | Mod: CPTII,,, | Performed by: UROLOGY

## 2023-05-16 PROCEDURE — 3288F PR FALLS RISK ASSESSMENT DOCUMENTED: ICD-10-PCS | Mod: CPTII,,, | Performed by: UROLOGY

## 2023-05-16 PROCEDURE — 1159F PR MEDICATION LIST DOCUMENTED IN MEDICAL RECORD: ICD-10-PCS | Mod: CPTII,,, | Performed by: UROLOGY

## 2023-05-16 PROCEDURE — 1159F MED LIST DOCD IN RCRD: CPT | Mod: CPTII,,, | Performed by: UROLOGY

## 2023-05-16 PROCEDURE — 99999 PR PBB SHADOW E&M-EST. PATIENT-LVL IV: CPT | Mod: PBBFAC,,, | Performed by: UROLOGY

## 2023-05-16 PROCEDURE — 3078F PR MOST RECENT DIASTOLIC BLOOD PRESSURE < 80 MM HG: ICD-10-PCS | Mod: CPTII,,, | Performed by: UROLOGY

## 2023-05-16 PROCEDURE — 1101F PT FALLS ASSESS-DOCD LE1/YR: CPT | Mod: CPTII,,, | Performed by: UROLOGY

## 2023-05-16 PROCEDURE — 1101F PR PT FALLS ASSESS DOC 0-1 FALLS W/OUT INJ PAST YR: ICD-10-PCS | Mod: CPTII,,, | Performed by: UROLOGY

## 2023-05-16 PROCEDURE — 3077F SYST BP >= 140 MM HG: CPT | Mod: CPTII,,, | Performed by: UROLOGY

## 2023-05-16 PROCEDURE — 99214 PR OFFICE/OUTPT VISIT, EST, LEVL IV, 30-39 MIN: ICD-10-PCS | Mod: ,,, | Performed by: UROLOGY

## 2023-05-16 PROCEDURE — 3288F FALL RISK ASSESSMENT DOCD: CPT | Mod: CPTII,,, | Performed by: UROLOGY

## 2023-05-16 PROCEDURE — 3078F DIAST BP <80 MM HG: CPT | Mod: CPTII,,, | Performed by: UROLOGY

## 2023-05-16 PROCEDURE — 3077F PR MOST RECENT SYSTOLIC BLOOD PRESSURE >= 140 MM HG: ICD-10-PCS | Mod: CPTII,,, | Performed by: UROLOGY

## 2023-05-16 PROCEDURE — 99214 OFFICE O/P EST MOD 30 MIN: CPT | Mod: ,,, | Performed by: UROLOGY

## 2023-05-16 PROCEDURE — 99999 PR PBB SHADOW E&M-EST. PATIENT-LVL IV: ICD-10-PCS | Mod: PBBFAC,,, | Performed by: UROLOGY

## 2023-05-16 PROCEDURE — 1126F PR PAIN SEVERITY QUANTIFIED, NO PAIN PRESENT: ICD-10-PCS | Mod: CPTII,,, | Performed by: UROLOGY

## 2023-05-16 RX ORDER — POTASSIUM CHLORIDE 600 MG/1
TABLET, FILM COATED, EXTENDED RELEASE ORAL
COMMUNITY
Start: 2023-04-25

## 2023-05-16 RX ORDER — CLONIDINE 0.1 MG/24H
1 PATCH, EXTENDED RELEASE TRANSDERMAL
COMMUNITY
Start: 2023-01-24

## 2023-05-16 RX ORDER — LOSARTAN POTASSIUM 100 MG/1
100 TABLET ORAL
COMMUNITY
Start: 2023-02-03

## 2023-05-16 RX ORDER — BRIMONIDINE TARTRATE 1.5 MG/ML
SOLUTION/ DROPS OPHTHALMIC
COMMUNITY
Start: 2023-05-15

## 2023-05-16 RX ORDER — ISOSORBIDE MONONITRATE 60 MG/1
60 TABLET, EXTENDED RELEASE ORAL 2 TIMES DAILY
COMMUNITY
Start: 2023-02-17

## 2023-05-16 RX ORDER — CLOPIDOGREL BISULFATE 75 MG/1
75 TABLET ORAL
COMMUNITY
Start: 2023-03-14

## 2023-05-16 RX ORDER — MULTIVITAMIN
1 TABLET ORAL DAILY
COMMUNITY

## 2023-05-16 RX ORDER — INSULIN LISPRO 100 [IU]/ML
INJECTION, SOLUTION INTRAVENOUS; SUBCUTANEOUS
COMMUNITY
Start: 2023-05-12

## 2023-05-16 RX ORDER — CHOLECALCIFEROL (VITAMIN D3) 25 MCG
1000 TABLET ORAL
COMMUNITY

## 2023-05-16 RX ORDER — ROSUVASTATIN CALCIUM 40 MG/1
40 TABLET, COATED ORAL
COMMUNITY
Start: 2023-02-22

## 2023-05-16 RX ORDER — ASPIRIN 81 MG/1
81 TABLET ORAL
COMMUNITY

## 2023-05-16 RX ORDER — LATANOPROST 50 UG/ML
SOLUTION/ DROPS OPHTHALMIC
COMMUNITY
Start: 2023-05-15

## 2023-05-16 RX ORDER — INSULIN DETEMIR 100 [IU]/ML
INJECTION, SOLUTION SUBCUTANEOUS
COMMUNITY
Start: 2023-05-07

## 2023-05-16 RX ORDER — HYDROCHLOROTHIAZIDE 12.5 MG/1
TABLET ORAL
COMMUNITY
Start: 2023-05-11

## 2023-05-16 NOTE — PROGRESS NOTES
[facility]        Urinary Retention Return Note     5/16/2023     Referred by:  No ref. provider found     HPI: Seema Kitchen is a very pleasant 83 y.o. female referred for evaluation of acute urinary retention of 1-2 weeks duration. She presented with an indwelling urethral catheter for 2 weeks. She presented to ED with urinary retention. Prior to this she denies any voiding difficulty, frequency, urgency or other issues. No previous urethral or bladder surgery. She is an insulin-dependent diabetic. She has a history of breast cancer. Urine at time showed TNTC RBC with few whites.     She wanted to discuss her longstanding urgency incontinence today.      A voiding trial was performed on a previous visit. She leaked out most of the instilled fluid prior to reaching the bathroom. PVR was 0 mL.      PVR last visit was 4 mL and today is 8 mL      Relevant Medications:  No current medications that would be anticipated to impair voiding     Prior Therapies:  None     She does not report prior urodynamic evaluation. She does not reports prior cystoscopic evaluation. CT without contrast demonstrated no  abnormalities.      A review of 10+ systems was conducted with pertinent positive and negative findings documented in HPI with all other systems reviewed and negative.     Past medical, family, surgical and social history reviewed as documented in chart with pertinent positive medical, family, surgical and social history detailed in HPI.     Exam Findings:     Const: no acute distress, conversant and alert  Eyes: anicteric, extraocular muscles intact  ENMT: normocephalic, Nl oral membranes  Cardio: no cyanosis, nl cap refill  Pulm: no tachypnea; no resp distress  Musc: no laceration, no tenderness  Neuro: alert; oriented x 3  Skin: warm, dry; no petichiae  Psych: no anxiety; normal speech           Assessment/Plan:     Chronic Urinary Retention (established,improved): It is unclear what may  have precipitated her urinary retention. She is emptying well, though she reports hesitancy and straining. She is hoping we can address her longstanding urgency incontinence. I would like to give her a little more time to be sure that she has no additional issues with retention. If PVR is again minimal next time, we can cautiously begin an OAB medication.

## 2023-07-07 ENCOUNTER — OFFICE VISIT (OUTPATIENT)
Dept: UROLOGY | Facility: CLINIC | Age: 83
End: 2023-07-07
Payer: MEDICARE

## 2023-07-07 VITALS
SYSTOLIC BLOOD PRESSURE: 185 MMHG | DIASTOLIC BLOOD PRESSURE: 70 MMHG | HEART RATE: 50 BPM | HEIGHT: 62 IN | BODY MASS INDEX: 23.21 KG/M2 | WEIGHT: 126.13 LBS

## 2023-07-07 DIAGNOSIS — N39.490 OVERFLOW INCONTINENCE: ICD-10-CM

## 2023-07-07 DIAGNOSIS — R33.9 URINARY RETENTION: ICD-10-CM

## 2023-07-07 PROCEDURE — 3077F PR MOST RECENT SYSTOLIC BLOOD PRESSURE >= 140 MM HG: ICD-10-PCS | Mod: CPTII,S$GLB,, | Performed by: UROLOGY

## 2023-07-07 PROCEDURE — 3078F DIAST BP <80 MM HG: CPT | Mod: CPTII,S$GLB,, | Performed by: UROLOGY

## 2023-07-07 PROCEDURE — 99214 OFFICE O/P EST MOD 30 MIN: CPT | Mod: S$GLB,,, | Performed by: UROLOGY

## 2023-07-07 PROCEDURE — 1125F AMNT PAIN NOTED PAIN PRSNT: CPT | Mod: CPTII,S$GLB,, | Performed by: UROLOGY

## 2023-07-07 PROCEDURE — 3288F FALL RISK ASSESSMENT DOCD: CPT | Mod: CPTII,S$GLB,, | Performed by: UROLOGY

## 2023-07-07 PROCEDURE — 99999 PR PBB SHADOW E&M-EST. PATIENT-LVL V: CPT | Mod: PBBFAC,,, | Performed by: UROLOGY

## 2023-07-07 PROCEDURE — 3077F SYST BP >= 140 MM HG: CPT | Mod: CPTII,S$GLB,, | Performed by: UROLOGY

## 2023-07-07 PROCEDURE — 3078F PR MOST RECENT DIASTOLIC BLOOD PRESSURE < 80 MM HG: ICD-10-PCS | Mod: CPTII,S$GLB,, | Performed by: UROLOGY

## 2023-07-07 PROCEDURE — 99214 PR OFFICE/OUTPT VISIT, EST, LEVL IV, 30-39 MIN: ICD-10-PCS | Mod: S$GLB,,, | Performed by: UROLOGY

## 2023-07-07 PROCEDURE — 1159F MED LIST DOCD IN RCRD: CPT | Mod: CPTII,S$GLB,, | Performed by: UROLOGY

## 2023-07-07 PROCEDURE — 1125F PR PAIN SEVERITY QUANTIFIED, PAIN PRESENT: ICD-10-PCS | Mod: CPTII,S$GLB,, | Performed by: UROLOGY

## 2023-07-07 PROCEDURE — 1101F PR PT FALLS ASSESS DOC 0-1 FALLS W/OUT INJ PAST YR: ICD-10-PCS | Mod: CPTII,S$GLB,, | Performed by: UROLOGY

## 2023-07-07 PROCEDURE — 99999 PR PBB SHADOW E&M-EST. PATIENT-LVL V: ICD-10-PCS | Mod: PBBFAC,,, | Performed by: UROLOGY

## 2023-07-07 PROCEDURE — 3288F PR FALLS RISK ASSESSMENT DOCUMENTED: ICD-10-PCS | Mod: CPTII,S$GLB,, | Performed by: UROLOGY

## 2023-07-07 PROCEDURE — 1159F PR MEDICATION LIST DOCUMENTED IN MEDICAL RECORD: ICD-10-PCS | Mod: CPTII,S$GLB,, | Performed by: UROLOGY

## 2023-07-07 PROCEDURE — 1101F PT FALLS ASSESS-DOCD LE1/YR: CPT | Mod: CPTII,S$GLB,, | Performed by: UROLOGY

## 2023-07-08 NOTE — PROGRESS NOTES
[facility]        Urinary Retention Return Note     7/7/2023     Referred by:  No ref. provider found     HPI: Seema Kitchen is a very pleasant 83 y.o. female referred for evaluation of acute urinary retention of 1-2 weeks duration. She presented with an indwelling urethral catheter for 2 weeks. She presented to ED with urinary retention. Prior to this she denied any voiding difficulty, frequency, urgency or other issues. No previous urethral or bladder surgery. She is an insulin-dependent diabetic. She has a history of breast cancer. Urine at time showed TNTC RBC with few whites.      She wanted to discuss her longstanding urgency incontinence as well, though treatment was deferred until I could be sure that her urinary retention was transient. It now appears so.        PVR over 3-4 prior visits has ranged from 4 - 18 mL.      Relevant Medications:  No current medications that would be anticipated to impair voiding     Prior Therapies:  None     She does not report prior urodynamic evaluation. She does not reports prior cystoscopic evaluation. CT without contrast demonstrated no  abnormalities.      A review of 10+ systems was conducted with pertinent positive and negative findings documented in HPI with all other systems reviewed and negative.     Past medical, family, surgical and social history reviewed as documented in chart with pertinent positive medical, family, surgical and social history detailed in HPI.     Exam Findings:     Const: no acute distress, conversant and alert  Eyes: anicteric, extraocular muscles intact  ENMT: normocephalic, Nl oral membranes  Cardio: no cyanosis, nl cap refill  Pulm: no tachypnea; no resp distress  Musc: no laceration, no tenderness  Neuro: alert; oriented x 3  Skin: warm, dry; no petichiae  Psych: no anxiety; normal speech           Assessment/Plan:     Chronic Urinary Retention (established,improved): It is unclear what may have precipitated  her urinary retention. She is emptying well repeatedly, though there is some straining to void. Urinalyses have remained normal since her visit.     Chronic Urinary Retention (new, maria antonia'l evaluation): She is hoping we can address her longstanding urgency incontinence. We will cautiously begin an OAB medication, checking back to insure this does not result in AUR.     Due to the significant risk of adverse events, I do not recommend the use of anticholinergics, especially oxybutynin in this patient. Anticholinergics such as oxybutynin chloride,have been demonstrated repeatedly to be independently associated with risk of both dementia and development of Alzheimer's disease in the elderly (age >65) ([employer] Intern Med. 2015;175(3):401-407. doi:10.1001/jamainternmed.2014.7663). Accordingly, this patient would need to limit pharmacologic treatment for bladder symptoms to either a beta-3 agonist such as Myrbetriq (mirabegron) or Gemtesa (vibegron). If beta-3 agonist therapy is contraindicated and pharmacologic therapy is necessary, the patient should limit anticholinergic bladder medications to those agents known to have more difficulty crossing the blood-brain barrier such as trospium.

## 2023-08-18 ENCOUNTER — OFFICE VISIT (OUTPATIENT)
Dept: UROLOGY | Facility: CLINIC | Age: 83
End: 2023-08-18
Payer: MEDICARE

## 2023-08-18 VITALS
WEIGHT: 123 LBS | HEIGHT: 62 IN | SYSTOLIC BLOOD PRESSURE: 161 MMHG | DIASTOLIC BLOOD PRESSURE: 74 MMHG | HEART RATE: 47 BPM | BODY MASS INDEX: 22.63 KG/M2

## 2023-08-18 DIAGNOSIS — R33.9 URINARY RETENTION: ICD-10-CM

## 2023-08-18 DIAGNOSIS — N32.81 OAB (OVERACTIVE BLADDER): Primary | ICD-10-CM

## 2023-08-18 PROCEDURE — 3288F FALL RISK ASSESSMENT DOCD: CPT | Mod: CPTII,S$GLB,, | Performed by: UROLOGY

## 2023-08-18 PROCEDURE — 1126F PR PAIN SEVERITY QUANTIFIED, NO PAIN PRESENT: ICD-10-PCS | Mod: CPTII,S$GLB,, | Performed by: UROLOGY

## 2023-08-18 PROCEDURE — 99214 PR OFFICE/OUTPT VISIT, EST, LEVL IV, 30-39 MIN: ICD-10-PCS | Mod: S$GLB,,, | Performed by: UROLOGY

## 2023-08-18 PROCEDURE — 3077F PR MOST RECENT SYSTOLIC BLOOD PRESSURE >= 140 MM HG: ICD-10-PCS | Mod: CPTII,S$GLB,, | Performed by: UROLOGY

## 2023-08-18 PROCEDURE — 99999 PR PBB SHADOW E&M-EST. PATIENT-LVL V: CPT | Mod: PBBFAC,,, | Performed by: UROLOGY

## 2023-08-18 PROCEDURE — 3078F DIAST BP <80 MM HG: CPT | Mod: CPTII,S$GLB,, | Performed by: UROLOGY

## 2023-08-18 PROCEDURE — 1101F PR PT FALLS ASSESS DOC 0-1 FALLS W/OUT INJ PAST YR: ICD-10-PCS | Mod: CPTII,S$GLB,, | Performed by: UROLOGY

## 2023-08-18 PROCEDURE — 99999 PR PBB SHADOW E&M-EST. PATIENT-LVL V: ICD-10-PCS | Mod: PBBFAC,,, | Performed by: UROLOGY

## 2023-08-18 PROCEDURE — 1101F PT FALLS ASSESS-DOCD LE1/YR: CPT | Mod: CPTII,S$GLB,, | Performed by: UROLOGY

## 2023-08-18 PROCEDURE — 99214 OFFICE O/P EST MOD 30 MIN: CPT | Mod: S$GLB,,, | Performed by: UROLOGY

## 2023-08-18 PROCEDURE — 1159F MED LIST DOCD IN RCRD: CPT | Mod: CPTII,S$GLB,, | Performed by: UROLOGY

## 2023-08-18 PROCEDURE — 3288F PR FALLS RISK ASSESSMENT DOCUMENTED: ICD-10-PCS | Mod: CPTII,S$GLB,, | Performed by: UROLOGY

## 2023-08-18 PROCEDURE — 3078F PR MOST RECENT DIASTOLIC BLOOD PRESSURE < 80 MM HG: ICD-10-PCS | Mod: CPTII,S$GLB,, | Performed by: UROLOGY

## 2023-08-18 PROCEDURE — 3077F SYST BP >= 140 MM HG: CPT | Mod: CPTII,S$GLB,, | Performed by: UROLOGY

## 2023-08-18 PROCEDURE — 1159F PR MEDICATION LIST DOCUMENTED IN MEDICAL RECORD: ICD-10-PCS | Mod: CPTII,S$GLB,, | Performed by: UROLOGY

## 2023-08-18 PROCEDURE — 1126F AMNT PAIN NOTED NONE PRSNT: CPT | Mod: CPTII,S$GLB,, | Performed by: UROLOGY

## 2023-08-18 NOTE — PROGRESS NOTES
[facility]        Urinary Retention Return Note     8/18/2023     Referred by:  No ref. provider found     HPI: Seema Kitchen is a very pleasant 83 y.o. female referred for evaluation of acute urinary retention of 1-2 weeks duration. She presented with an indwelling urethral catheter for 2 weeks. She presented to ED with urinary retention. Prior to this she denied any voiding difficulty, frequency, urgency or other issues. No previous urethral or bladder surgery. She is an insulin-dependent diabetic. She has a history of breast cancer. Urine at time showed TNTC RBC with few whites.      She wanted to discuss her longstanding urgency incontinence as well, though treatment was deferred until I could be sure that her urinary retention was transient. It now appears so.      Last visit, she was started on Myrbetriq 25 mg for bothersome UUI. However, she stopped taking this after 1 day when she reported diarrhea.      PVR over 3-4 prior visits has ranged from 4 - 18 mL. PVR today is 0 mL.      Relevant Medications:  No current medications that would be anticipated to impair voiding     Prior Therapies:  None     She does not report prior urodynamic evaluation. She does not reports prior cystoscopic evaluation. CT without contrast demonstrated no  abnormalities.      A review of 10+ systems was conducted with pertinent positive and negative findings documented in HPI with all other systems reviewed and negative.     Past medical, family, surgical and social history reviewed as documented in chart with pertinent positive medical, family, surgical and social history detailed in HPI.     Exam Findings:     Const: no acute distress, conversant and alert  Eyes: anicteric, extraocular muscles intact  ENMT: normocephalic, Nl oral membranes  Cardio: no cyanosis, nl cap refill  Pulm: no tachypnea; no resp distress  Musc: no laceration, no tenderness  Neuro: alert; oriented x 3  Skin: warm, dry; no  denise  Psych: no anxiety; normal speech           Assessment/Plan:     Chronic Urinary Retention (established,improved): It is unclear what may have precipitated her urinary retention. She is emptying well repeatedly, though there is some straining to void. Urinalyses have remained normal since her visit.      Overactive bladder (new, addti'l evaluation): She is hoping we can address her longstanding urgency incontinence. We will cautiously begin an OAB medication, checking back to insure this does not result in AUR. I'm skeptical that single dose of Myrbetriq would cause the diarrhea she reported. I am going to have her try the medication once again and notify me if the same side effect occurs.      Due to the significant risk of adverse events, I do not recommend the use of anticholinergics, especially oxybutynin in this patient. Anticholinergics such as oxybutynin chloride,have been demonstrated repeatedly to be independently associated with risk of both dementia and development of Alzheimer's disease in the elderly (age >65) ([employer] Intern Med. 2015;175(3):401-407. doi:10.1001/jamainternmed.2014.7663). Accordingly, this patient would need to limit pharmacologic treatment for bladder symptoms to either a beta-3 agonist such as Myrbetriq (mirabegron) or Gemtesa (vibegron). If beta-3 agonist therapy is contraindicated and pharmacologic therapy is necessary, the patient should limit anticholinergic bladder medications to those agents known to have more difficulty crossing the blood-brain barrier such as trospium.

## 2023-12-20 ENCOUNTER — TELEPHONE (OUTPATIENT)
Dept: UROLOGY | Facility: CLINIC | Age: 83
End: 2023-12-20
Payer: MEDICARE

## 2023-12-20 NOTE — TELEPHONE ENCOUNTER
Called pt in regards to pts message. Pt confirmed and v/u of new upcoming apt date and time.     Mag [facility] Staff  Caller: Unspecified (Today,  1:55 PM)  Name of Who is Calling: AMENA AGUILAR [[id number]]        What is the request in detail: Needs to reschedule appt.        Can the clinic reply by [facility]: No        [facility]: [phone]

## 2024-01-03 ENCOUNTER — TELEPHONE (OUTPATIENT)
Dept: UROLOGY | Facility: CLINIC | Age: 84
End: 2024-01-03
Payer: MEDICAID

## 2024-01-03 NOTE — TELEPHONE ENCOUNTER
Called pt in regards to rescheduling appt on 2/23/24 at 10:40 am. First available appt is 3/6/24 at 11:20 am. Pt accepted this time and date.

## 2024-01-12 ENCOUNTER — TELEPHONE (OUTPATIENT)
Dept: UROLOGY | Facility: CLINIC | Age: 84
End: 2024-01-12
Payer: MEDICAID

## 2024-01-12 NOTE — TELEPHONE ENCOUNTER
Called pt in regards to rescheduling appt on 2/23/24 at 10:40 am w/Dr Franklin. Provider no longer in clinic on Friday, will move to Tuesday and Wednesday. First available appt is 2/27/24 at 1:40 pm. Pt accepted this time and date.

## 2024-02-27 ENCOUNTER — TELEPHONE (OUTPATIENT)
Dept: UROLOGY | Facility: CLINIC | Age: 84
End: 2024-02-27
Payer: MEDICAID

## 2024-02-27 NOTE — TELEPHONE ENCOUNTER
Called pt in regards to message in portal about rescheduling appt today 2/27/24. Pt stated that she did not have transportation. No answer, no voicemail to leave a message.

## 2024-02-27 NOTE — TELEPHONE ENCOUNTER
----- Message from Sergio Woodson sent at 2/27/2024  2:15 PM CST -----  Regarding: appt  Contact: [phone]  Pt is calling to rs appt today with the provider due to transportation did not show up. Please call

## 2024-03-11 ENCOUNTER — TELEPHONE (OUTPATIENT)
Dept: UROLOGY | Facility: CLINIC | Age: 84
End: 2024-03-11
Payer: MEDICAID

## 2024-03-11 NOTE — TELEPHONE ENCOUNTER
Attempted to return phone call to pt to schedule office visit. No answer, VM box not set up    ----- Message from Isabela Godwin sent at 3/11/2024  1:31 PM CDT -----  Seema Kitchen calling regarding Appointment Access for wanting to be rescheduled for the missed appt for bladder issues, call back to inform of next available  [phone]

## 2024-04-16 ENCOUNTER — TELEPHONE (OUTPATIENT)
Dept: UROLOGY | Facility: CLINIC | Age: 84
End: 2024-04-16
Payer: MEDICAID

## 2024-04-16 NOTE — TELEPHONE ENCOUNTER
Scheduled pt for a f/u visit on 5/7 at 9:20 am    ----- Message from You Franklin MD sent at 4/16/2024  2:01 PM CDT -----  Regarding: RE: appt access  Contact: pt @ [phone]  She has a prescription valid through July 2024. Will need to see her back to renew.   CG  ----- Message -----  From: Yeimi Mckee MA  Sent: 4/16/2024   1:59 PM CDT  To: You Franklin MD  Subject: FW: appt access                                  Pt requesting refill of mirabegron (MYRBETRIQ) 25 mg Tb24 ER tablet. Last seen in August  ----- Message -----  From: John Blakely  Sent: 4/16/2024   1:49 PM CDT  To: Rigoberto Orta Staff  Subject: appt access                                      Caller: Seema     Reason for call: Pt is needing to be seen for follow from appt that was missed 02/27; pt is advising that she was told someone would call her to reschedule and she has yet to hear back.   Please call to advise further. Thank you for all you are doing.    Rx Refill/Request Is this a Refill or New Rx: refill     Rx Name and Strength: mirabegron (MYRBETRIQ) 25 mg Tb24 ER tablet    Preferred Pharmacy with phone number:     [facility]  [address] [address] [address]  Phone: [phone] Fax: [phone]       Communication Preference: call back     Additional Information:  Pt is needing a refill of mirabegron (MYRBETRIQ) 25 mg Tb24 ER tablet as she is out of her RX. Please call to further advise pt. Thank you for all you are doing.

## 2024-05-07 ENCOUNTER — TELEPHONE (OUTPATIENT)
Dept: UROLOGY | Facility: CLINIC | Age: 84
End: 2024-05-07
Payer: MEDICAID

## 2024-05-07 NOTE — TELEPHONE ENCOUNTER
Attempted to call pt to r/s 9:20 am appt. No answer and no VM box set up    ----- Message from Sammie Dominguez sent at 5/7/2024  9:12 AM CDT -----  Regarding: resche aot  Contact: @ [phone]  Pt is calling to reschedule appointment on today due to transportation   ...no available dates Pleaes call and adv @ [phone]

## 2024-05-15 ENCOUNTER — TELEPHONE (OUTPATIENT)
Dept: UROLOGY | Facility: CLINIC | Age: 84
End: 2024-05-15
Payer: MEDICAID

## 2024-05-15 NOTE — TELEPHONE ENCOUNTER
Attempted to call pt to schedule f/u visit. No VM box set up    ----- Message from Rin Singh sent at 5/15/2024  1:54 PM CDT -----  Regarding: Appt  Contact: pt @ [phone]  Pt is calling to get appt rescheduled. Asking for a call back

## 2024-07-16 ENCOUNTER — TELEPHONE (OUTPATIENT)
Dept: UROLOGY | Facility: CLINIC | Age: 84
End: 2024-07-16
Payer: MEDICAID

## 2024-07-16 NOTE — TELEPHONE ENCOUNTER
Scheduled pt for follow up visit for medication refill on 7/31    ----- Message from Shelley Medina sent at 7/16/2024  3:28 PM CDT -----  Regarding: refill  Contact: [phone] or [phone]  Patient is calling to get her ( mirabegron (MYRBETRIQ) 25 mg Tb24 ER tablet ) refilled. Please contact patient at [phone] or [phone]

## 2024-07-22 ENCOUNTER — TELEPHONE (OUTPATIENT)
Dept: UROLOGY | Facility: CLINIC | Age: 84
End: 2024-07-22
Payer: MEDICAID

## 2024-07-22 NOTE — TELEPHONE ENCOUNTER
Called pt to inform her that Dr. Franklin sent in prescription for mirabegron (MYRBETRIQ) 25 mg Tb24 ER tablet. Pt voiced understanding    ----- Message from You Franklin MD sent at 7/22/2024 12:49 PM CDT -----  Regarding: RE: refill  Contact: [phone] or [phone]  Done  CG  ----- Message -----  From: Yeimi Mckee MA  Sent: 7/16/2024   4:18 PM CDT  To: You Franklin MD  Subject: FW: refill                                       Pt scheduled for follow up visit on 7/31. Requesting refill of mirabegron (MYRBETRIQ) 25 mg Tb24 ER tablet.  ----- Message -----  From: Shelley Medina  Sent: 7/16/2024   3:31 PM CDT  To: Rigoberto Orta Staff  Subject: refill                                           Patient is calling to get her ( mirabegron (MYRBETRIQ) 25 mg Tb24 ER tablet ) refilled. Please contact patient at [phone] or [phone]

## 2024-07-31 ENCOUNTER — OFFICE VISIT (OUTPATIENT)
Dept: UROLOGY | Facility: CLINIC | Age: 84
End: 2024-07-31
Payer: MEDICAID

## 2024-07-31 VITALS
BODY MASS INDEX: 22.46 KG/M2 | DIASTOLIC BLOOD PRESSURE: 66 MMHG | WEIGHT: 122.81 LBS | HEART RATE: 50 BPM | SYSTOLIC BLOOD PRESSURE: 169 MMHG

## 2024-07-31 DIAGNOSIS — N32.81 OAB (OVERACTIVE BLADDER): Primary | ICD-10-CM

## 2024-07-31 PROCEDURE — 99214 OFFICE O/P EST MOD 30 MIN: CPT | Mod: PBBFAC | Performed by: UROLOGY

## 2024-07-31 PROCEDURE — 3077F SYST BP >= 140 MM HG: CPT | Mod: CPTII,,, | Performed by: UROLOGY

## 2024-07-31 PROCEDURE — 1159F MED LIST DOCD IN RCRD: CPT | Mod: CPTII,,, | Performed by: UROLOGY

## 2024-07-31 PROCEDURE — 3288F FALL RISK ASSESSMENT DOCD: CPT | Mod: CPTII,,, | Performed by: UROLOGY

## 2024-07-31 PROCEDURE — 3078F DIAST BP <80 MM HG: CPT | Mod: CPTII,,, | Performed by: UROLOGY

## 2024-07-31 PROCEDURE — 1101F PT FALLS ASSESS-DOCD LE1/YR: CPT | Mod: CPTII,,, | Performed by: UROLOGY

## 2024-07-31 PROCEDURE — 99213 OFFICE O/P EST LOW 20 MIN: CPT | Mod: S$PBB,,, | Performed by: UROLOGY

## 2024-07-31 PROCEDURE — 1125F AMNT PAIN NOTED PAIN PRSNT: CPT | Mod: CPTII,,, | Performed by: UROLOGY

## 2024-07-31 PROCEDURE — 99999 PR PBB SHADOW E&M-EST. PATIENT-LVL IV: CPT | Mod: PBBFAC,,, | Performed by: UROLOGY

## 2024-07-31 RX ORDER — MIRABEGRON 25 MG/1
25 TABLET, FILM COATED, EXTENDED RELEASE ORAL DAILY
Qty: 90 TABLET | Refills: 3 | Status: SHIPPED | OUTPATIENT
Start: 2024-07-31 | End: 2025-07-31

## 2024-07-31 NOTE — PROGRESS NOTES
[facility]      Return Overactive Bladder (OAB) Note    7/31/2024    Referred by:  No ref. provider found    CHIEF COMPLAINT:  The patient presents for a refill of Myrbetriq medication for urinary incontinence and to discuss urinary symptoms.    HPI:  Ms. Kitchen, an 84-year-old established patient, was last seen 1 year ago in August for urinary retention of 1-2 weeks' duration. At that time, she had an indwelling urethral catheter for 2 weeks and subsequently passed voiding trials. She also discussed her longstanding urinary incontinence. She was started on Myrbetriq cautiously, which she has been taking for the past year.    Ms. Kitchen reports that when taking Myrbetriq, she has less frequent urination and fewer nighttime bathroom visits. Without the medication, she gets up about twice at night to urinate. Since stopping the medication, she reports increased nighttime urination frequency, possibly 3 or 4 times. She also notes that her urine flow has become slower since discontinuing the medication, describing it as slow and thick.    When taking Myrbetriq, patient states she had regular urination and was satisfied with the medication's effects. She expresses a desire to restart the medication due to the return of symptoms after discontinuation.    MEDICATIONS:  Myrbetriq, duration of use: approximately 1 year, reason for use: urinary incontinence, indicator of effectiveness: patient reports it is helping, side effects: patient initially reported diarrhea after taking it for a day, but this was not considered a typical side effect by the provider.    MEDICAL HISTORY:  Urinary incontinence: longstanding.    TEST RESULTS:  Postvoid residuals: Over 3-4 visits, always negligible. Voiding trials: Passed after having an indwelling urethral catheter for 2 weeks.          A review of 10+ systems was conducted with pertinent positive and negative findings documented in HPI with all other systems reviewed  and negative.    Past medical, family, surgical and social history reviewed as documented in chart with pertinent positive medical, family, surgical and social history detailed in HPI.    Exam Findings:    Physical Exam    General: No acute distress. Nontoxic appearing.  HENT: Normocephalic. Atraumatic.  Respiratory: Normal respiratory effort. No conversational dyspnea. No audible wheezing.  Abdomen: No obvious distension.  Skin: No visible abnormalities.  Extremities: No edema upper extremities. No edema lower extremities.  Neurological: Alert and oriented x3. Normal speech.  Psychiatric: Normal mood. Normal affect. No evidence of SI.          Assessment/Plan:    Assessment & Plan    IMPRESSION:  - Assessed patient's response to Myrbetriq for urinary incontinence  - Determined Myrbetriq was effective in reducing nocturia and improving urinary flow  - Will restart Myrbetriq based on patient's positive response and worsening symptoms after discontinuation  OVERACTIVE BLADDER:  - Explained that diarrhea is not typically a side effect of Myrbetriq.  - Discussed that slower urinary flow might be due to more frequent urination resulting in less bladder volume.  - Restarted Myrbetriq and provided 90-day prescription.  - Follow up in 1 year to assess ongoing efficacy of Myrbetriq.

## 2024-10-16 ENCOUNTER — TELEPHONE (OUTPATIENT)
Dept: UROLOGY | Facility: CLINIC | Age: 84
End: 2024-10-16
Payer: MEDICARE

## 2024-10-16 NOTE — TELEPHONE ENCOUNTER
Called pt and scheduled pt for 10/23 at 1:40 pm with Batsheva Cerda NP  ----- Message from Summer sent at 10/16/2024 11:09 AM CDT -----  .Type:  RX Refill Request    Who Called? PT     Refill or New Rx? REFILL     RX Name and Strength? mirabegron (MYRBETRIQ) 25 mg Tb24 ER tablet    Preferred Pharmacy with phone number? [facility] [phone]    Pt Call Back Number? [phone]    Additional Information:  PT STATED SHE HAS A BLADDER INFECTION. I SCHEDULED HER IN NOV/2024 BECAUSE SHE DECLINED TO SEE THE NP SHE ONLY WANTS TO SEE THE M.D       Thank You

## 2024-10-23 ENCOUNTER — OFFICE VISIT (OUTPATIENT)
Dept: UROLOGY | Facility: CLINIC | Age: 84
End: 2024-10-23
Payer: MEDICARE

## 2024-10-23 VITALS
DIASTOLIC BLOOD PRESSURE: 71 MMHG | SYSTOLIC BLOOD PRESSURE: 147 MMHG | WEIGHT: 127.44 LBS | HEART RATE: 64 BPM | BODY MASS INDEX: 23.31 KG/M2

## 2024-10-23 DIAGNOSIS — R10.2 SUPRAPUBIC PAIN: Primary | ICD-10-CM

## 2024-10-23 PROCEDURE — 3288F FALL RISK ASSESSMENT DOCD: CPT | Mod: CPTII,S$GLB,,

## 2024-10-23 PROCEDURE — 1159F MED LIST DOCD IN RCRD: CPT | Mod: CPTII,S$GLB,,

## 2024-10-23 PROCEDURE — 99213 OFFICE O/P EST LOW 20 MIN: CPT | Mod: S$GLB,,,

## 2024-10-23 PROCEDURE — 99999 PR PBB SHADOW E&M-EST. PATIENT-LVL IV: CPT | Mod: PBBFAC,,,

## 2024-10-23 PROCEDURE — 1125F AMNT PAIN NOTED PAIN PRSNT: CPT | Mod: CPTII,S$GLB,,

## 2024-10-23 PROCEDURE — 1160F RVW MEDS BY RX/DR IN RCRD: CPT | Mod: CPTII,S$GLB,,

## 2024-10-23 PROCEDURE — 1101F PT FALLS ASSESS-DOCD LE1/YR: CPT | Mod: CPTII,S$GLB,,

## 2024-10-23 PROCEDURE — 87086 URINE CULTURE/COLONY COUNT: CPT

## 2024-10-23 PROCEDURE — 3077F SYST BP >= 140 MM HG: CPT | Mod: CPTII,S$GLB,,

## 2024-10-23 PROCEDURE — 3078F DIAST BP <80 MM HG: CPT | Mod: CPTII,S$GLB,,

## 2024-10-23 RX ORDER — NITROFURANTOIN 25; 75 MG/1; MG/1
100 CAPSULE ORAL 2 TIMES DAILY
Qty: 10 CAPSULE | Refills: 0 | Status: SHIPPED | OUTPATIENT
Start: 2024-10-23 | End: 2024-10-28

## 2024-10-23 NOTE — PROGRESS NOTES
[facility]      Urinary Tract Infection Evaluation Note    10/23/2024    Referred by:  No ref. provider found    HPI: Seema Kitchen is a very pleasant 84 y.o. female who is an established patient seen today for evaluation of acute cystitis. She reports that symptoms began several weeks ago. Symptoms suggestive that this episode is associated with urinary tract infection include: suprapubic pain, frequency, and urgency.     She reports no bowel symptoms. She reports urinary hesitancy.     Independent of episodes of infection, she reports symptoms of irritative voiding including frequency, incontinence, and urgency. She reports symptoms of obstructive voiding including hesitancy. Her history includes no notation of urolithiasis, hematuria, prior pelvic surgery, previous prolapse or incontinence procedures or neurological symptoms/diagnoses.     She was last seen in July and prescribed Myrbetriq. She reports she hasn't been taking it because the pharmacy never delivered her refill.     A review of 10+ systems was conducted with pertinent positive and negative findings documented in HPI with all other systems reviewed and negative.    Past medical, family, surgical and social history reviewed as documented in chart with pertinent positive medical, family, surgical and social history detailed in HPI.    Exam Findings:    Const: no acute distress, conversant and alert  Eyes: anicteric, extraocular muscles intact  ENMT: normocephalic, Nl oral membranes  Cardio: no cyanosis, nl cap refill  Pulm: no tachypnea; no resp distress  Abd: soft, no tenderness  Musc: no laceration, no tenderness  Neuro: alert; oriented x 3  Skin: warm, dry; no petichiae  Psych: no anxiety; normal speech       Assessment/Plan:    Acute Cystitis (est, addt'l workup):    Patient appears to present today with symptomatic lower urinary tract infection; will begin therapy for acute cystitis with nitrofurantoin 100 mg po BID x 5 days.    Urine sent for culture. Will call with results.     I spent a total of 20 minutes on the day of the visit.This includes face to face time and non-face to face time preparing to see the patient (eg, review of tests), obtaining and/or reviewing separately obtained history, documenting clinical information in the electronic or other health record, independently interpreting results and communicating results to the patient/family/caregiver, or care coordinator.

## 2024-10-24 LAB
BACTERIA UR CULT: NORMAL
BACTERIA UR CULT: NORMAL

## 2024-10-29 ENCOUNTER — TELEPHONE (OUTPATIENT)
Dept: UROLOGY | Facility: CLINIC | Age: 84
End: 2024-10-29
Payer: MEDICARE

## 2024-11-05 ENCOUNTER — OFFICE VISIT (OUTPATIENT)
Dept: UROLOGY | Facility: CLINIC | Age: 84
End: 2024-11-05
Payer: MEDICARE

## 2024-11-05 VITALS
SYSTOLIC BLOOD PRESSURE: 182 MMHG | WEIGHT: 127 LBS | DIASTOLIC BLOOD PRESSURE: 76 MMHG | HEART RATE: 80 BPM | BODY MASS INDEX: 23.23 KG/M2

## 2024-11-05 DIAGNOSIS — R30.0 DYSURIA: Primary | ICD-10-CM

## 2024-11-05 PROCEDURE — 3078F DIAST BP <80 MM HG: CPT | Mod: CPTII,S$GLB,, | Performed by: UROLOGY

## 2024-11-05 PROCEDURE — 1125F AMNT PAIN NOTED PAIN PRSNT: CPT | Mod: CPTII,S$GLB,, | Performed by: UROLOGY

## 2024-11-05 PROCEDURE — 51701 INSERT BLADDER CATHETER: CPT | Mod: S$GLB,,, | Performed by: UROLOGY

## 2024-11-05 PROCEDURE — 1101F PT FALLS ASSESS-DOCD LE1/YR: CPT | Mod: CPTII,S$GLB,, | Performed by: UROLOGY

## 2024-11-05 PROCEDURE — 99214 OFFICE O/P EST MOD 30 MIN: CPT | Mod: 25,S$GLB,, | Performed by: UROLOGY

## 2024-11-05 PROCEDURE — 3288F FALL RISK ASSESSMENT DOCD: CPT | Mod: CPTII,S$GLB,, | Performed by: UROLOGY

## 2024-11-05 PROCEDURE — 3077F SYST BP >= 140 MM HG: CPT | Mod: CPTII,S$GLB,, | Performed by: UROLOGY

## 2024-11-05 PROCEDURE — 99999 PR PBB SHADOW E&M-EST. PATIENT-LVL IV: CPT | Mod: PBBFAC,,, | Performed by: UROLOGY

## 2024-11-05 PROCEDURE — 87086 URINE CULTURE/COLONY COUNT: CPT | Performed by: UROLOGY

## 2024-11-05 PROCEDURE — 1159F MED LIST DOCD IN RCRD: CPT | Mod: CPTII,S$GLB,, | Performed by: UROLOGY

## 2024-11-05 NOTE — PROGRESS NOTES
[facility]      Urinary Tract Infection Evaluation Note    11/5/2024    Referred by:  No ref. provider found    HPI: Seema Kitchen is a very pleasant 84 y.o. female who is an established patient seen today for evaluation of recent acute cystitis. She reports that symptoms began several weeks ago. Symptoms suggestive that this episode is associated with urinary tract infection include: suprapubic pain, frequency, and urgency. Her recent urine culture was contaminated.     She reports no bowel symptoms. She reports urinary hesitancy.     Independent of episodes of infection, she reports symptoms of irritative voiding including frequency, incontinence, and urgency. She reports symptoms of obstructive voiding including hesitancy. Her history includes no notation of urolithiasis, hematuria, prior pelvic surgery, previous prolapse or incontinence procedures or neurological symptoms/diagnoses.     She was last seen in July and prescribed Myrbetriq. She reports she hasn't been taking it because the pharmacy never delivered her refill.     I catheterized today for a cath PVR of 80 mL and sent urine for culture.     A review of 10+ systems was conducted with pertinent positive and negative findings documented in HPI with all other systems reviewed and negative.    Past medical, family, surgical and social history reviewed as documented in chart with pertinent positive medical, family, surgical and social history detailed in HPI.    Exam Findings:    Const: no acute distress, conversant and alert  Eyes: anicteric, extraocular muscles intact  ENMT: normocephalic, Nl oral membranes  Cardio: no cyanosis, nl cap refill  Pulm: no tachypnea; no resp distress  Abd: soft, no tenderness  Musc: no laceration, no tenderness  Neuro: alert; oriented x 3  Skin: warm, dry; no petichiae  Psych: no anxiety; normal speech       Assessment/Plan:  Assessment & Plan    - Continued Myrbetriq for overactive bladder management  after patient reported increased nighttime urination frequency when attempting to stop  - Ordered catheterized urine specimen to confirm presence of urinary tract infection and assess bladder emptying  - Catheterized postvoid residual was 80 mL  - Awaiting urine culture results to determine need for antibiotic treatment    OVERACTIVE BLADDER:  - Continued Myrbetriq at current dose for overactive bladder.    URINARY TRACT INFECTION (SUSPECTED):  - Ordered catheterized urine specimen for culture.  - Performed catheterized postvoid residual measurement.  - Start Macrobid (already prescribed) if urine culture is positive.  - Contact the office if symptoms of urinary tract infection persist or worsen.    MEDICATION EDUCATION:  - Explained misconception about kidney disease and diabetes contraindications for prescribed medication.

## 2024-11-06 LAB — BACTERIA UR CULT: NO GROWTH

## 2024-12-04 ENCOUNTER — TELEPHONE (OUTPATIENT)
Dept: UROLOGY | Facility: CLINIC | Age: 84
End: 2024-12-04
Payer: MEDICARE

## 2024-12-04 NOTE — TELEPHONE ENCOUNTER
Attempted to call pt to inform her of her urine cx results. No VM box set up.    ----- Message from Rand sent at 12/4/2024  9:52 AM CST -----  Name of Caller:     Nature of Call: Requesting a Call about Results    Best Call Back Number: [phone]     Additional Information:Seema Kitchen calling regarding Results for urine test that she completed at her last appt. stating she was never call to be informed of results and the next plan of care because Pt is still having the same issues with a weak stream and pain in lower part of her stomach. Please call back to assist

## 2024-12-18 ENCOUNTER — TELEPHONE (OUTPATIENT)
Dept: UROLOGY | Facility: CLINIC | Age: 84
End: 2024-12-18
Payer: MEDICARE

## 2024-12-18 NOTE — TELEPHONE ENCOUNTER
Called pt and informed we received her message. Pt stated she was given nitrofurantoin for a UTI and inquired if she should take it. Pt states she has kidney disease and diabetes and read that she cannot take the medication due to that. Informed pt I would ask and get back to her. Pt states she still has a weak stream and pain in the bottom of her stomach.     ----- Message from Annie sent at 12/18/2024  2:08 PM CST -----  Regarding: PT ADVICE  Contact: [phone]  Pt is requesting a call from someone in the office and is asking for a return call back soon. Thanks.     Reason for call:discuss plan of care for UTI AND RX      Patient's DX:     Patient requesting call back or [facility] msg: [phone]

## 2024-12-18 NOTE — TELEPHONE ENCOUNTER
"Called pt and informed that Dr. Fraknlin has already educated her on the potential contraindications. Per Dr. Franklin's note:  "MEDICATION EDUCATION:  - Explained misconception about kidney disease and diabetes contraindications for prescribed medication."  Informed that Dr. Franklin would not have let her take macrobid if he thought it would be harmful to her.   Pt voiced understanding  "

## 2025-03-25 ENCOUNTER — TELEPHONE (OUTPATIENT)
Dept: UROLOGY | Facility: CLINIC | Age: 85
End: 2025-03-25
Payer: MEDICARE

## 2025-04-02 ENCOUNTER — OFFICE VISIT (OUTPATIENT)
Dept: UROLOGY | Facility: CLINIC | Age: 85
End: 2025-04-02
Payer: MEDICARE

## 2025-04-02 VITALS
DIASTOLIC BLOOD PRESSURE: 78 MMHG | SYSTOLIC BLOOD PRESSURE: 160 MMHG | WEIGHT: 129.63 LBS | HEART RATE: 64 BPM | BODY MASS INDEX: 23.71 KG/M2

## 2025-04-02 DIAGNOSIS — N39.0 RECURRENT UTI: ICD-10-CM

## 2025-04-02 DIAGNOSIS — R10.9 ABDOMINAL PAIN, UNSPECIFIED ABDOMINAL LOCATION: Primary | ICD-10-CM

## 2025-04-02 DIAGNOSIS — N32.81 OAB (OVERACTIVE BLADDER): ICD-10-CM

## 2025-04-02 PROCEDURE — 1159F MED LIST DOCD IN RCRD: CPT | Mod: CPTII,S$GLB,, | Performed by: UROLOGY

## 2025-04-02 PROCEDURE — 3288F FALL RISK ASSESSMENT DOCD: CPT | Mod: CPTII,S$GLB,, | Performed by: UROLOGY

## 2025-04-02 PROCEDURE — 99214 OFFICE O/P EST MOD 30 MIN: CPT | Mod: S$GLB,,, | Performed by: UROLOGY

## 2025-04-02 PROCEDURE — 87086 URINE CULTURE/COLONY COUNT: CPT | Performed by: UROLOGY

## 2025-04-02 PROCEDURE — 99999 PR PBB SHADOW E&M-EST. PATIENT-LVL III: CPT | Mod: PBBFAC,,, | Performed by: UROLOGY

## 2025-04-02 PROCEDURE — 1101F PT FALLS ASSESS-DOCD LE1/YR: CPT | Mod: CPTII,S$GLB,, | Performed by: UROLOGY

## 2025-04-02 PROCEDURE — 3077F SYST BP >= 140 MM HG: CPT | Mod: CPTII,S$GLB,, | Performed by: UROLOGY

## 2025-04-02 PROCEDURE — 3078F DIAST BP <80 MM HG: CPT | Mod: CPTII,S$GLB,, | Performed by: UROLOGY

## 2025-04-02 PROCEDURE — G2211 COMPLEX E/M VISIT ADD ON: HCPCS | Mod: S$GLB,,, | Performed by: UROLOGY

## 2025-04-02 RX ORDER — MIRABEGRON 25 MG/1
25 TABLET, FILM COATED, EXTENDED RELEASE ORAL DAILY
Qty: 90 TABLET | Refills: 3 | Status: SHIPPED | OUTPATIENT
Start: 2025-04-02 | End: 2025-04-04 | Stop reason: SDUPTHER

## 2025-04-04 ENCOUNTER — TELEPHONE (OUTPATIENT)
Dept: UROLOGY | Facility: CLINIC | Age: 85
End: 2025-04-04
Payer: MEDICARE

## 2025-04-04 LAB — BACTERIA UR CULT: NO GROWTH

## 2025-04-04 RX ORDER — MIRABEGRON 25 MG/1
25 TABLET, FILM COATED, EXTENDED RELEASE ORAL DAILY
Qty: 90 TABLET | Refills: 3 | Status: SHIPPED | OUTPATIENT
Start: 2025-04-04 | End: 2026-04-04

## 2025-07-11 ENCOUNTER — HOSPITAL ENCOUNTER (EMERGENCY)
Facility: HOSPITAL | Age: 85
Discharge: HOME | End: 2025-07-11
Attending: STUDENT IN AN ORGANIZED HEALTH CARE EDUCATION/TRAINING PROGRAM
Payer: MEDICARE

## 2025-07-11 VITALS
TEMPERATURE: 98 F | HEIGHT: 62 IN | WEIGHT: 127 LBS | OXYGEN SATURATION: 99 % | SYSTOLIC BLOOD PRESSURE: 145 MMHG | BODY MASS INDEX: 23.37 KG/M2 | RESPIRATION RATE: 20 BRPM | DIASTOLIC BLOOD PRESSURE: 70 MMHG | HEART RATE: 60 BPM

## 2025-07-11 DIAGNOSIS — H92.01 RIGHT EAR PAIN: Primary | ICD-10-CM

## 2025-07-11 DIAGNOSIS — M54.2 NECK PAIN: ICD-10-CM

## 2025-07-11 PROCEDURE — 99283 EMERGENCY DEPT VISIT LOW MDM: CPT | Mod: 25

## 2025-07-11 PROCEDURE — 25000003 PHARM REV CODE 250: Performed by: STUDENT IN AN ORGANIZED HEALTH CARE EDUCATION/TRAINING PROGRAM

## 2025-07-11 RX ORDER — IBUPROFEN 400 MG/1
400 TABLET, FILM COATED ORAL
Status: DISCONTINUED | OUTPATIENT
Start: 2025-07-11 | End: 2025-07-11 | Stop reason: HOSPADM

## 2025-07-11 RX ORDER — ACETAMINOPHEN 325 MG/1
650 TABLET ORAL
Status: COMPLETED | OUTPATIENT
Start: 2025-07-11 | End: 2025-07-11

## 2025-07-11 RX ADMIN — ACETAMINOPHEN 650 MG: 325 TABLET ORAL at 10:07

## 2025-07-11 NOTE — ED PROVIDER NOTES
Encounter Date: 7/11/2025    SCRIBE #1 NOTE: I, Heidi Grimm, am scribing for, and in the presence of,  Jenn Humphries DO. I have scribed the following portions of the note - Other sections scribed: HPI, ROS, PE, MDM.       History     Chief Complaint   Patient presents with    Otalgia     Pt to ED c/o right ear pain that started on yesterday. Denies drainage. Denies sore throat or runny nose.      Seema Kitchen is a 85 y.o. female, with a PMHx of breast cancer s/p mastectomy, HTN, IDDM, HLD, rheumatoid arhritis, LVH, CAD s/p stent placement, a-fib on clopidogrel, aspirin, Aortic valve sclerosis with aortic area murmur, GERD, CKD III, osteoporosis, who presents to the ED with right ear pain x 1 day. Patient reports the pain is a striking pain to the jaw area under her right ear, feels similar to infection in ear she had a long time ago. Attempted treatment with Gabapentin without relief of today's symptoms. No other exacerbating or alleviating factors. Denies ear drainage, hearing loss, tinnitus, new/worsening headache, neck pain, fever, chills, chest pain, dyspnea, or other associated symptoms.       The history is provided by the patient. No  was used.     Review of patient's allergies indicates:   Allergen Reactions    Rosuvastatin Other (See Comments)     Muscle aches     Past Medical History:   Diagnosis Date    Arthritis     Cancer     breast    Diabetes mellitus     Glaucoma     Hypertension      Past Surgical History:   Procedure Laterality Date    HYSTERECTOMY      MASTECTOMY       No family history on file.  Social History[1]  Review of Systems   Constitutional:  Negative for chills and fever.   HENT:  Positive for ear pain (Right). Negative for sore throat.    Eyes:  Negative for visual disturbance.   Respiratory:  Negative for shortness of breath.    Cardiovascular:  Negative for chest pain.   Gastrointestinal:  Negative for abdominal pain, diarrhea, nausea  and vomiting.   Genitourinary:  Negative for decreased urine volume, dysuria and hematuria.   Musculoskeletal:  Negative for back pain, myalgias, neck pain and neck stiffness.   Skin:  Negative for rash.   Neurological:  Negative for weakness and headaches.   Psychiatric/Behavioral: Negative.  Negative for confusion.        Physical Exam     Initial Vitals [07/11/25 0852]   BP Pulse Resp Temp SpO2   (!) 145/70 60 20 97.9 °F (36.6 °C) 99 %      MAP       --         Physical Exam    Nursing note and vitals reviewed.  Constitutional: Vital signs are normal. She appears well-developed and well-nourished. She is not diaphoretic.  Non-toxic appearance. She does not have a sickly appearance. She does not appear ill.   HENT:   Head: Normocephalic and atraumatic.   Right Ear: Hearing, tympanic membrane and external ear normal. No drainage, swelling or tenderness. No foreign bodies. No mastoid tenderness. Tympanic membrane is not injected, not scarred, not perforated and not erythematous. No middle ear effusion. No hemotympanum. No decreased hearing is noted.   Left Ear: Hearing, tympanic membrane, external ear and ear canal normal. Mouth/Throat: Uvula is midline, oropharynx is clear and moist and mucous membranes are normal. No trismus in the jaw.   Cerumen to right ear.    Eyes: Conjunctivae and EOM are normal. Pupils are equal, round, and reactive to light. Right conjunctiva is not injected. Left conjunctiva is not injected. No scleral icterus.   Neck: Neck supple. No JVD present.   Normal range of motion.   Full passive range of motion without pain.     Cardiovascular:  Normal rate, regular rhythm, S1 normal, S2 normal, normal heart sounds, intact distal pulses and normal pulses.     Exam reveals no gallop and no friction rub.       No murmur heard.  Pulses:       Radial pulses are 2+ on the right side and 2+ on the left side.        Dorsalis pedis pulses are 2+ on the right side and 2+ on the left side.        Posterior  tibial pulses are 2+ on the right side and 2+ on the left side.   Pulmonary/Chest: Effort normal and breath sounds normal. No stridor. No respiratory distress.   Abdominal: Abdomen is soft. She exhibits no distension. There is no abdominal tenderness. There is no rebound and no guarding.   Musculoskeletal:         General: No edema. Normal range of motion.      Cervical back: Full passive range of motion without pain, normal range of motion and neck supple.      Comments: Good active ROM of all extremities. No lower extremity edema or cyanosis.      Neurological: She is alert and oriented to person, place, and time. She has normal strength. She displays no atrophy and no tremor. No cranial nerve deficit or sensory deficit. She exhibits normal muscle tone. She displays no seizure activity. Gait normal.   A&Ox4, normal speech. Moves all extremities, follows all commands, no focal neurologic deficits.      Skin: Skin is warm. No ecchymosis and no rash noted.   Psychiatric: She has a normal mood and affect. Thought content normal.         ED Course   Procedures  Labs Reviewed - No data to display       Imaging Results              X-Ray Cervical Spine AP And Lateral (Final result)  Result time 07/11/25 11:50:48      Final result by Alfonzo Lind MD (07/11/25 11:50:48)                   Impression:      No convincing evidence of acute grossly displaced fracture or dislocation.      Electronically signed by: Alfonzo Lind  Date:    07/11/2025  Time:    11:50               Narrative:    EXAMINATION:  XR CERVICAL SPINE AP LATERAL    CLINICAL HISTORY:  Cervicalgia    TECHNIQUE:  AP, lateral and open mouth views of the cervical spine were performed.    COMPARISON:  None.    FINDINGS:  No convincing evidence of acute displaced fracture or traumatic subluxation    Airway appears patent.  No prevertebral soft tissue swelling.    Multilevel degenerative loss of disc space height with adjoining endplate sclerosis.   Degenerative findings of the uncinate processes and facets.  No acute findings the visualized portion of the chest.  Odontoid grossly intact.  Lateral masses of C1 and C2 grossly aligned.                                       Medications   acetaminophen tablet 650 mg (650 mg Oral Given 7/11/25 1000)     Medical Decision Making   MDM  This is an emergent evaluation of a 85 y.o. with right ear pain x 1 day. Initial vitals in the ED [07/11/25 0852]  BP: (!) 145/70  Pulse: 60  Resp: 20  Temp: 97.9 °F (36.6 °C)  SpO2: 99 % .     Physical exam noted above. DDx includes but is not limited to TMJ, ear infection, referred pain, occult fracture, muscular strain, arthritis, chronic pain. Also considered but clinically less likely to be dissection, mastoiditis, otitis externa, stroke. Will obtain imaging including x-ray of the cervical spine. Will also provide PO tylenol for pain. Will continue to monitor and frequently reassess pending results of labs, treatments and final disposition.    Patient is aware of plan and is amenable.     Jenn Humphries D.O  EMERGENCY MEDICINE  9:27 AM 07/11/2025    UPDATE  X-ray of the cervical spine shows no obvious displaced fracture or dislocation.  There is degenerative changes.  Patient was treated with p.o. Tylenol.  On reassessment, her pain was slightly improved.  Given history, presentation in the setting of a benign exam and workup here today, I feel symptoms are less likely due to a life-threatening cause. I discussed with the patient/family the diagnosis, treatment plan, indications for return to the emergency department, and for expected follow-up. The patient/family verbalized an understanding. The patient/family is asked if there are any questions or concerns. We discuss the case, until all issues are addressed to the patient/family's satisfaction. Patient/family understands and is agreeable to the plan.     Jenn Humphries D.O  EMERGENCY MEDICINE   12:37 PM  07/11/2025         This chart was completed using dictation software, as a result there may be some transcription errors      Amount and/or Complexity of Data Reviewed  Radiology: ordered and independent interpretation performed. Decision-making details documented in ED Course.    Risk  OTC drugs.  Prescription drug management.            Scribe Attestation:   Scribe #1: I performed the above scribed service and the documentation accurately describes the services I performed. I attest to the accuracy of the note.                               Clinical Impression:  Final diagnoses:  [M54.2] Neck pain  [H92.01] Right ear pain (Primary)          ED Disposition Condition    Discharge Stable          ED Prescriptions    None       Follow-up Information       Follow up With Specialties Details Why Contact Info    Saravanan Ashley MD Internal Medicine Schedule an appointment as soon as possible for a visit in 3 days Emergency Room Follow-up [address] [facility] [address]  [facility] [address]  [phone]      [facility] Emergency Dept Emergency Medicine Go to  If symptoms worsen [address] [facility] [place] [address] [phone]            IJenn DO  , personally performed the services described in this documentation. All medical record entries made by the scribe were at my direction and in my presence. I have reviewed the chart and agree that the record reflects my personal performance and is accurate and complete.         [1]   Social History  Tobacco Use    Smoking status: Every Day    Smokeless tobacco: Current   Substance Use Topics    Alcohol use: Yes     Comment: occasional    Drug use: No        Jenn Humphries DO  07/13/25 0653

## 2025-07-11 NOTE — DISCHARGE INSTRUCTIONS
Thank you for coming to our Emergency Department today. It is important to remember that some problems or medical conditions are difficult to diagnose and may not be found during your Emergency Department visit.     Be sure to follow up with your primary care doctor and review all labs/imaging/tests that were performed during your ER visit with them. Some labs/tests may be outside of the normal range and require non-emergent follow-up and further investigation to help diagnose/exclude/prevent complications or other potentially serious medical conditions that were not addressed during your ER visit.    If you do not have a primary care doctor, you may contact the one listed on your discharge paperwork or you may also call the [facility] Appointment Desk at [phone] to schedule an appointment and establish care with one. It is important to your health that you have a primary care doctor.    Please take all medications as directed. All medications may potentially have side-effects and it is impossible to predict which medications may give you side-effects or what side-effects (if any) they will give you.. If you feel that you are having a negative effect or side-effect of any medication you should immediately stop taking them and seek medical attention. If you feel that you are having a life-threatening reaction call 911.    Return to the ER with any questions/concerns, new/concerning symptoms, worsening or failure to improve.     Do not drive, swim, climb to height, take a bath, operate heavy machinery, drink alcohol or take potentially sedating medications, sign any legal documents or make any important decisions for 24 hours if you have received any pain medications, sedatives or mood altering drugs during your ER visit or within 24 hours of taking them if they have been prescribed to you.     You can find additional resources for Dentists, hearing aids, durable medical equipment, low cost pharmacies and  other resources at https://[facility].org    BELOW THIS LINE ONLY APPLIES IF YOU HAVE A COVID TEST PENDING OR IF YOU HAVE BEEN DIAGNOSED WITH COVID:  Please access [facility] to review the results of your test. Until the results of your COVID test return, you should isolate yourself so as not to potentially spread illness to others.   If your COVID test returns positive, you should isolate yourself so as not to spread illness to others. After five full days, if you are feeling better and you have not had fever for 24 hours, you can return to your typical daily activities, but you must wear a mask around others for an additional 5 days.   If your COVID test returns negative and you are either unvaccinated or more than six months out from your two-dose vaccine and are not yet boosted, you should still quarantine for 5 full days followed by strict mask use for an additional 5 full days.   If your COVID test returns negative and you have received your 2-dose initial vaccine as well as a booster, you should continue strict mask use for 10 full days after the exposure.  For all those exposed, best practice includes a test at day 5 after the exposure. This can be a home test or a test through one of the many testing centers throughout our community.   Masking is always advised to limit the spread of COVID. Cdc.gov is an excellent site to obtain the latest up to date recommendations regarding COVID and COVID testing.     CDC Testing and Quarantine Guidelines for patients with exposure to a known-positive COVID-19 person:  A close exposure is defined as anyone who has had an exposure (masked or unmasked) to a known COVID -19 positive person within 6 feet of someone for a cumulative total of 15 minutes or more over a 24-hour period.   Vaccinated and/or if you recently had a positive covid test within 90 days do NOT need to quarantine after contact with someone who had COVID-19 unless you develop symptoms.   Fully vaccinated  people who have not had a positive test within 90 days, should get tested 3-5 days after their exposure, even if they don't have symptoms and wear a mask indoors in public for 14 days following exposure or until their test result is negative.      Unvaccinated and/or NOT had a positive test within 90 days and meet close exposure  You are required by CDC guidelines to quarantine for at least 5 days from time of exposure followed by 5 days of strict masking. It is recommended, but not required to test after 5 days, unless you develop symptoms, in which case you should test at that time.  If you get tested after 5 days and your test is positive, your 5 day period of isolation starts the day of the positive test.    If your exposure does not meet the above definition, you can return to your normal daily activities to include social distancing, wearing a mask and frequent handwashing.      Here is a link to guidance from the CDC:  https://www.cdc.gov/media/releases/2021/s1227-isolation-quarantine-guidance.html      [place] Dept Of Health Testing Sites:  https://ldh.la.gov/page/3934      [facility] website with testing locations and guidance:  https://[email or web address].org/selfcare

## 2025-07-11 NOTE — ED TRIAGE NOTES
Pt to ED from home with c/o right ear pain which presented last night while pt was laying down. Pt states she feels inner ear pain and pain behind her right ear. Pt denies any drainage or trauma. Pt Aox4, denies any other complaints.

## 2025-08-06 ENCOUNTER — TELEPHONE (OUTPATIENT)
Dept: UROLOGY | Facility: CLINIC | Age: 85
End: 2025-08-06
Payer: MEDICARE

## 2025-08-06 NOTE — TELEPHONE ENCOUNTER
Spoke with pt about scheduling the recall appointment. Scheduled her for September 23rd. Pt voiced understanding of appointment date and time.        Copied from CRM #[id number]. Topic: Appointments - Appointment Access  >> Aug 6, 2025  2:30 PM Rell wrote:  Scheduling Request           Appt Type:  ep     Date/Time Preference: Late September     Treating Provider:Dr. Franklin     Caller Name:pt     Contact Preference:[phone]      Comment: in regards to scheduling from recall. Nothing available in epic. Please call to advise thank you